# Patient Record
Sex: MALE | Race: WHITE | NOT HISPANIC OR LATINO | Employment: OTHER | URBAN - METROPOLITAN AREA
[De-identification: names, ages, dates, MRNs, and addresses within clinical notes are randomized per-mention and may not be internally consistent; named-entity substitution may affect disease eponyms.]

---

## 2023-09-28 PROBLEM — L21.9 SEBORRHEIC DERMATITIS, UNSPECIFIED: Status: ACTIVE | Noted: 2023-04-11

## 2023-09-28 PROBLEM — M23.91 INTERNAL DERANGEMENT OF KNEE JOINT, RIGHT: Status: ACTIVE | Noted: 2023-09-28

## 2023-09-28 PROBLEM — C44.41 BASAL CELL CARCINOMA OF SKIN OF SCALP AND NECK: Status: ACTIVE | Noted: 2023-04-11

## 2023-09-28 PROBLEM — L30.4 ERYTHEMA INTERTRIGO: Status: ACTIVE | Noted: 2023-04-11

## 2023-09-28 PROBLEM — Z85.46 HISTORY OF PROSTATE CANCER: Status: ACTIVE | Noted: 2023-09-28

## 2023-09-28 PROBLEM — D49.9 NEOPLASM OF UNSPECIFIED BEHAVIOR OF UNSPECIFIED SITE: Status: ACTIVE | Noted: 2023-04-11

## 2023-09-28 PROBLEM — C44.519 BASAL CELL CARCINOMA OF SKIN OF TRUNK, EXCEPT SCROTUM: Status: ACTIVE | Noted: 2023-04-11

## 2023-09-28 PROBLEM — D22.62 MELANOCYTIC NEVI OF LEFT UPPER LIMB, INCLUDING SHOULDER: Status: ACTIVE | Noted: 2023-04-11

## 2023-09-28 PROBLEM — M25.562 LEFT KNEE PAIN: Status: ACTIVE | Noted: 2023-09-28

## 2023-09-28 PROBLEM — L70.0 ACNE VULGARIS: Status: ACTIVE | Noted: 2023-04-11

## 2023-09-28 PROBLEM — M75.100 ROTATOR CUFF TEAR: Status: ACTIVE | Noted: 2023-09-28

## 2023-09-28 PROBLEM — D22.5 MELANOCYTIC NEVI OF TRUNK: Status: ACTIVE | Noted: 2023-04-11

## 2023-09-28 PROBLEM — B35.1 TINEA UNGUIUM: Status: ACTIVE | Noted: 2023-04-11

## 2023-09-28 PROBLEM — M25.561 RIGHT KNEE PAIN: Status: ACTIVE | Noted: 2023-09-28

## 2023-09-28 PROBLEM — L72.3 SEBACEOUS CYST: Status: ACTIVE | Noted: 2023-04-11

## 2023-09-28 PROBLEM — K43.9 EPIGASTRIC HERNIA: Status: ACTIVE | Noted: 2023-09-28

## 2023-09-28 PROBLEM — D22.70 MELANOCYTIC NEVI OF LOWER LIMB, INCLUDING HIP: Status: ACTIVE | Noted: 2023-04-11

## 2023-09-28 PROBLEM — L56.8 OTHER SPECIFIED ACUTE SKIN CHANGES DUE TO ULTRAVIOLET RADIATION: Status: ACTIVE | Noted: 2023-04-11

## 2023-09-28 PROBLEM — L82.1 OTHER SEBORRHEIC KERATOSIS: Status: ACTIVE | Noted: 2023-04-11

## 2023-09-28 PROBLEM — Z85.828 PERSONAL HISTORY OF OTHER MALIGNANT NEOPLASM OF SKIN: Status: ACTIVE | Noted: 2023-04-11

## 2023-09-28 PROBLEM — L90.5 SCAR CONDITION AND FIBROSIS OF SKIN: Status: ACTIVE | Noted: 2023-04-11

## 2023-09-28 PROBLEM — B35.3 TINEA PEDIS: Status: ACTIVE | Noted: 2023-04-11

## 2023-09-28 PROBLEM — J06.9 UPPER RESPIRATORY INFECTION: Status: ACTIVE | Noted: 2023-09-28

## 2023-09-28 PROBLEM — D36.7 BENIGN NEOPLASM OF TRUNK: Status: ACTIVE | Noted: 2023-04-11

## 2023-09-28 PROBLEM — M25.511 RIGHT SHOULDER PAIN: Status: ACTIVE | Noted: 2023-09-28

## 2023-09-28 PROBLEM — L73.8 OTHER SPECIFIED FOLLICULAR DISORDERS: Status: ACTIVE | Noted: 2023-04-11

## 2023-09-28 PROBLEM — C44.612 BASAL CELL CARCINOMA OF SKIN OF RIGHT UPPER LIMB, INCLUDING SHOULDER: Status: ACTIVE | Noted: 2023-04-11

## 2023-09-28 PROBLEM — L71.9 ROSACEA, UNSPECIFIED: Status: ACTIVE | Noted: 2023-04-11

## 2023-09-28 PROBLEM — E29.1 HYPOGONADISM MALE: Status: ACTIVE | Noted: 2023-09-28

## 2023-09-28 PROBLEM — J45.909 ASTHMA (HHS-HCC): Status: ACTIVE | Noted: 2023-09-28

## 2023-09-28 PROBLEM — L91.8 OTHER HYPERTROPHIC DISORDERS OF THE SKIN: Status: ACTIVE | Noted: 2023-04-11

## 2023-09-28 PROBLEM — R39.198 DIFFICULTY URINATING: Status: ACTIVE | Noted: 2023-09-28

## 2023-09-28 PROBLEM — L90.5 SCAR: Status: ACTIVE | Noted: 2023-04-11

## 2023-09-28 PROBLEM — L81.4 OTHER MELANIN HYPERPIGMENTATION: Status: ACTIVE | Noted: 2023-04-11

## 2023-09-28 PROBLEM — B35.4 TINEA CORPORIS: Status: ACTIVE | Noted: 2023-04-11

## 2023-09-28 PROBLEM — D48.5 NEOPLASM OF UNCERTAIN BEHAVIOR OF SKIN: Status: ACTIVE | Noted: 2023-04-11

## 2023-09-28 RX ORDER — KETOCONAZOLE 20 MG/G
1 CREAM TOPICAL
COMMUNITY
Start: 2019-06-27

## 2023-09-28 RX ORDER — VALACYCLOVIR HYDROCHLORIDE 1 G/1
1 TABLET, FILM COATED ORAL
COMMUNITY
Start: 2016-04-27 | End: 2023-10-26 | Stop reason: ALTCHOICE

## 2023-09-28 RX ORDER — OXYCODONE AND ACETAMINOPHEN 5; 325 MG/1; MG/1
1 TABLET ORAL EVERY 6 HOURS PRN
COMMUNITY
Start: 2022-08-19 | End: 2023-10-26 | Stop reason: ALTCHOICE

## 2023-09-28 RX ORDER — VALACYCLOVIR HYDROCHLORIDE 500 MG/1
500 TABLET, FILM COATED ORAL 3 TIMES DAILY
COMMUNITY
Start: 2021-04-22

## 2023-09-28 RX ORDER — BUDESONIDE AND FORMOTEROL FUMARATE DIHYDRATE 80; 4.5 UG/1; UG/1
AEROSOL RESPIRATORY (INHALATION)
COMMUNITY
Start: 2018-02-06 | End: 2024-01-05 | Stop reason: DRUGHIGH

## 2023-09-28 RX ORDER — ALBUTEROL SULFATE 90 UG/1
2 AEROSOL, METERED RESPIRATORY (INHALATION)
COMMUNITY
Start: 2014-05-21

## 2023-09-28 RX ORDER — TERBINAFINE HYDROCHLORIDE 250 MG/1
250 TABLET ORAL
COMMUNITY
Start: 2021-10-14

## 2023-09-28 RX ORDER — METRONIDAZOLE 7.5 MG/G
1 CREAM TOPICAL
COMMUNITY
Start: 2019-06-27

## 2023-10-26 ENCOUNTER — OFFICE VISIT (OUTPATIENT)
Dept: SURGERY | Facility: CLINIC | Age: 68
End: 2023-10-26
Payer: COMMERCIAL

## 2023-10-26 VITALS
BODY MASS INDEX: 33.29 KG/M2 | SYSTOLIC BLOOD PRESSURE: 147 MMHG | HEART RATE: 83 BPM | DIASTOLIC BLOOD PRESSURE: 80 MMHG | WEIGHT: 232 LBS

## 2023-10-26 DIAGNOSIS — K42.9 UMBILICAL HERNIA WITHOUT OBSTRUCTION AND WITHOUT GANGRENE: Primary | ICD-10-CM

## 2023-10-26 PROCEDURE — 1126F AMNT PAIN NOTED NONE PRSNT: CPT | Performed by: SURGERY

## 2023-10-26 PROCEDURE — 1159F MED LIST DOCD IN RCRD: CPT | Performed by: SURGERY

## 2023-10-26 PROCEDURE — 1160F RVW MEDS BY RX/DR IN RCRD: CPT | Performed by: SURGERY

## 2023-10-26 PROCEDURE — 99213 OFFICE O/P EST LOW 20 MIN: CPT | Performed by: SURGERY

## 2023-10-26 ASSESSMENT — PAIN SCALES - GENERAL: PAINLEVEL: 0-NO PAIN

## 2023-10-26 NOTE — PROGRESS NOTES
History Of Present Illness  Zak Brizuela is a 67 y.o. male presenting with an umbilical hernia.  1 year ago I did an epigastric hernia.  He notices slightly popping up below his umbilicus.  He does routinely get skin cancer checks.  He is a small area on his umbilical hernia that his dermatologist is following.  His health is otherwise been maintained.      Last Recorded Vitals  Blood pressure 147/80, pulse 83, weight 105 kg (232 lb).  Physical Examination  He has an incision vertically above his umbilicus.  At his umbilicus he does have hernia that is reducible.  On the top of that hernia he does have a small skin changes.    Relevant Results    Assessment/Plan umbilical hernia with a skin change on top.  We will plan on excising the skin changes for pathology along with repairing the hernia.  Since it is in the same area as the last one  will placed a small piece of mesh underneath to help decrease the risk for hernia recurrence.    Harris Gunderson MD FACS  Professor of Surgery  Tammy Goodwin Chair in Surgical [place]  [facility] [employer]  [address] [place] [address], [address]  Phone [phone]  email: [email or web address]

## 2023-11-09 RX ORDER — BUDESONIDE AND FORMOTEROL FUMARATE DIHYDRATE 160; 4.5 UG/1; UG/1
2 AEROSOL RESPIRATORY (INHALATION) 2 TIMES DAILY
COMMUNITY
Start: 2023-10-09

## 2023-11-15 ENCOUNTER — TELEPHONE (OUTPATIENT)
Dept: PRIMARY CARE | Facility: CLINIC | Age: 68
End: 2023-11-15
Payer: COMMERCIAL

## 2023-11-15 DIAGNOSIS — Z00.00 WELLNESS EXAMINATION: Primary | ICD-10-CM

## 2023-11-15 DIAGNOSIS — I35.1 MILD AORTIC REGURGITATION: ICD-10-CM

## 2023-11-15 DIAGNOSIS — I77.810 ASCENDING AORTA DILATATION (CMS-HCC): ICD-10-CM

## 2023-11-15 PROBLEM — Z86.16 HISTORY OF COVID-19: Status: ACTIVE | Noted: 2022-01-10

## 2023-11-15 PROBLEM — E78.5 DYSLIPIDEMIA: Status: ACTIVE | Noted: 2023-09-13

## 2023-11-15 PROBLEM — E34.9 TESTOSTERONE DEFICIENCY: Status: ACTIVE | Noted: 2018-02-06

## 2023-11-15 PROBLEM — I51.9 LEFT VENTRICULAR DIASTOLIC DYSFUNCTION: Status: ACTIVE | Noted: 2023-06-23

## 2023-11-15 PROBLEM — C44.92 SCCA (SQUAMOUS CELL CARCINOMA) OF SKIN: Status: ACTIVE | Noted: 2018-02-06

## 2023-11-15 PROBLEM — J45.30 MILD PERSISTENT ASTHMA WITHOUT COMPLICATION (HHS-HCC): Status: ACTIVE | Noted: 2017-02-01

## 2023-11-15 PROBLEM — R93.1 AGATSTON CORONARY ARTERY CALCIUM SCORE LESS THAN 100: Status: ACTIVE | Noted: 2022-08-23

## 2023-11-15 PROBLEM — G47.33 OSA (OBSTRUCTIVE SLEEP APNEA): Status: ACTIVE | Noted: 2023-08-15

## 2023-11-15 PROBLEM — C43.9 MELANOMA (MULTI): Status: ACTIVE | Noted: 2018-02-06

## 2023-11-15 PROBLEM — D12.6 TUBULAR ADENOMA OF COLON: Status: ACTIVE | Noted: 2021-05-25

## 2023-11-15 PROBLEM — C44.91 BCC (BASAL CELL CARCINOMA OF SKIN): Status: ACTIVE | Noted: 2018-02-06

## 2023-11-15 PROBLEM — K21.9 GASTROESOPHAGEAL REFLUX DISEASE: Status: ACTIVE | Noted: 2017-02-01

## 2023-11-16 PROBLEM — I77.810 ASCENDING AORTA DILATATION (CMS-HCC): Status: ACTIVE | Noted: 2023-11-16

## 2023-11-16 NOTE — TELEPHONE ENCOUNTER
Patient and I spoke.    He is looking to start on medical weight loss program and has been seen by endocrinology at [facility] (Dr. Eng).  Recommendation has been to try phentermine.  Previous echocardiogram notable for mild aortic regurgitation/aorta dilation.  Patient is not symptomatic.  Cardiology consult requested by endocrinology, the patient has not been successful in scheduling timely [facility] cardiology consult.  He would like to see  cardiologist.    Message will be sent to one of the  cardiologist regarding scheduling a consult.    Patient will also be scheduled for physical in Jan or Feb.  Orders to be placed for visit.    Hang Dugan MD

## 2023-11-29 ENCOUNTER — PATIENT MESSAGE (OUTPATIENT)
Dept: DERMATOLOGY | Facility: CLINIC | Age: 68
End: 2023-11-29
Payer: COMMERCIAL

## 2023-12-01 PROBLEM — I10 ESSENTIAL HYPERTENSION: Status: ACTIVE | Noted: 2023-11-30

## 2023-12-04 ENCOUNTER — ANESTHESIA EVENT (OUTPATIENT)
Dept: OPERATING ROOM | Facility: HOSPITAL | Age: 68
End: 2023-12-04
Payer: COMMERCIAL

## 2023-12-04 NOTE — ANESTHESIA PREPROCEDURE EVALUATION
Patient: Zak Brizuela    Procedure Information       Date/Time: 12/05/23 0830    Procedure: Umbilical Hernia Repair    Location: [facility] OR 09 / Virtual [facility] OR    Surgeons: Harris Gunderson MD            Relevant Problems   Anesthesia  Past Surgical History:  04/14/2014: CATARACT EXTRACTION      Comment:  Cataract Extraction  04/15/2014: HERNIA REPAIR      Comment:  Inguinal Hernia Repair  05/29/2016: KNEE ARTHROSCOPY W/ DEBRIDEMENT      Comment:  Arthroscopy Knee Right        Cardiovascular  TTE may 2023:    CONCLUSIONS:   - Exam indication: Shortness of Breath   - The left ventricle is normal in size. Left ventricular systolic function is   normal. EF = 56 ± 5% (2D biplane) Grade I left ventricular diastolic dysfunction.   - The right ventricle is normal in size. Right ventricular systolic function is   normal.   - The visualized aorta is dilated with a maximal dimension of 4.3 cm.   - There is mild (1+ - 2+) aortic valve regurgitation.   - The patient has not had a prior CC echocardiographic exam for comparison.      (+) Essential hypertension   (+) Mild aortic regurgitation      GI   (+) Gastroesophageal reflux disease      Pulmonary  PFT 8.8.23:    IMPRESSION:   Spirometry indicates moderate obstruction.   There is no significant bronchodilator response.       Home Sleep Apnea Study:    IMPRESSION/RECOMMENDATIONS:   1. This study confirms a diagnosis of at least mild obstructive sleep apnea.   2. The results of this study may represent an underestimation of the degree   of obstructive sleep apnea, especially hypopneas, because of the known   limitations of HSAT, such as inability to record arousals because EEG is not   recorded.   3. Treatment of mild sleep apnea can include treatment of allergies, oral   appliance therapy or ENT evaluation of any airway abnormalities. PAP therapy   may be considered in patients with documented symptoms of daytime sleepiness,   impaired cognition, mood disorder, insomnia, or  documented hypertension,   ischemic heart disease, or history of stroke.      (+) Asthma   (+) Mild persistent asthma without complication   (+) KEVIN (obstructive sleep apnea)      Infectious Disease   (+) Tinea corporis   (+) Tinea pedis   (+) Tinea unguium   (+) Upper respiratory infection      Other  Past Medical History:  01/06/2015: Malignant neoplasm of prostate (CMS/HCC)      Comment:  Prostate cancer  No date: Meralgia paresthetica, left lower limb      Comment:  Meralgia paresthetica of left side  No date: Personal history of (healed) traumatic fracture      Comment:  History of fracture of ankle  No date: Personal history of malignant melanoma of skin      Comment:  History of malignant melanoma  04/14/2014: Personal history of other diseases of the respiratory   system      Comment:  History of acute bronchitis  No date: Personal history of other diseases of urinary system      Comment:  History of urethral stricture  No date: Personal history of other malignant neoplasm of skin      Comment:  History of basal cell carcinoma         Clinical information reviewed:                   NPO Detail:  No data recorded     Physical Exam    Airway  Mallampati: II  TM distance: >3 FB  Neck ROM: full     Cardiovascular   Rhythm: regular     Dental    Pulmonary   Breath sounds clear to auscultation     Abdominal            Anesthesia Plan    ASA 3     MAC     The patient is not a current smoker.  Patient did not smoke on day of procedure.    intravenous induction   Anesthetic plan and risks discussed with patient.    Plan discussed with CRNA.

## 2023-12-05 ENCOUNTER — ANESTHESIA (OUTPATIENT)
Dept: OPERATING ROOM | Facility: HOSPITAL | Age: 68
End: 2023-12-05
Payer: COMMERCIAL

## 2023-12-05 ENCOUNTER — HOSPITAL ENCOUNTER (OUTPATIENT)
Facility: HOSPITAL | Age: 68
Setting detail: OUTPATIENT SURGERY
Discharge: HOME | End: 2023-12-05
Attending: SURGERY | Admitting: SURGERY
Payer: COMMERCIAL

## 2023-12-05 VITALS
DIASTOLIC BLOOD PRESSURE: 55 MMHG | OXYGEN SATURATION: 95 % | RESPIRATION RATE: 16 BRPM | TEMPERATURE: 97.5 F | HEART RATE: 88 BPM | SYSTOLIC BLOOD PRESSURE: 135 MMHG

## 2023-12-05 DIAGNOSIS — K42.9 UMBILICAL HERNIA WITHOUT OBSTRUCTION AND WITHOUT GANGRENE: Primary | ICD-10-CM

## 2023-12-05 PROCEDURE — 2500000002 HC RX 250 W HCPCS SELF ADMINISTERED DRUGS (ALT 637 FOR MEDICARE OP, ALT 636 FOR OP/ED): Performed by: ANESTHESIOLOGY

## 2023-12-05 PROCEDURE — 7100000001 HC RECOVERY ROOM TIME - INITIAL BASE CHARGE: Performed by: SURGERY

## 2023-12-05 PROCEDURE — 3600000003 HC OR TIME - INITIAL BASE CHARGE - PROCEDURE LEVEL THREE: Performed by: SURGERY

## 2023-12-05 PROCEDURE — 3700000002 HC GENERAL ANESTHESIA TIME - EACH INCREMENTAL 1 MINUTE: Performed by: SURGERY

## 2023-12-05 PROCEDURE — 3600000008 HC OR TIME - EACH INCREMENTAL 1 MINUTE - PROCEDURE LEVEL THREE: Performed by: SURGERY

## 2023-12-05 PROCEDURE — 2720000007 HC OR 272 NO HCPCS: Performed by: SURGERY

## 2023-12-05 PROCEDURE — 94640 AIRWAY INHALATION TREATMENT: CPT | Mod: 59 | Performed by: ANESTHESIOLOGY

## 2023-12-05 PROCEDURE — 0753T DGTZ GLS MCRSCP SLD LEVEL IV: CPT | Mod: TC,SUR,AHULAB | Performed by: SURGERY

## 2023-12-05 PROCEDURE — 2500000004 HC RX 250 GENERAL PHARMACY W/ HCPCS (ALT 636 FOR OP/ED): Performed by: SURGERY

## 2023-12-05 PROCEDURE — A49591 PR RPR AA HERNIA 1ST < 3 CM REDUCIBLE: Performed by: NURSE ANESTHETIST, CERTIFIED REGISTERED

## 2023-12-05 PROCEDURE — 88305 TISSUE EXAM BY PATHOLOGIST: CPT | Performed by: PATHOLOGY

## 2023-12-05 PROCEDURE — 7100000002 HC RECOVERY ROOM TIME - EACH INCREMENTAL 1 MINUTE: Performed by: SURGERY

## 2023-12-05 PROCEDURE — A49591 PR RPR AA HERNIA 1ST < 3 CM REDUCIBLE: Performed by: ANESTHESIOLOGY

## 2023-12-05 PROCEDURE — 2500000005 HC RX 250 GENERAL PHARMACY W/O HCPCS: Performed by: NURSE ANESTHETIST, CERTIFIED REGISTERED

## 2023-12-05 PROCEDURE — 7100000009 HC PHASE TWO TIME - INITIAL BASE CHARGE: Performed by: SURGERY

## 2023-12-05 PROCEDURE — 2500000004 HC RX 250 GENERAL PHARMACY W/ HCPCS (ALT 636 FOR OP/ED): Performed by: NURSE ANESTHETIST, CERTIFIED REGISTERED

## 2023-12-05 PROCEDURE — 3700000001 HC GENERAL ANESTHESIA TIME - INITIAL BASE CHARGE: Performed by: SURGERY

## 2023-12-05 PROCEDURE — 2500000004 HC RX 250 GENERAL PHARMACY W/ HCPCS (ALT 636 FOR OP/ED): Performed by: ANESTHESIOLOGY

## 2023-12-05 PROCEDURE — 94762 N-INVAS EAR/PLS OXIMTRY CONT: CPT | Mod: 59 | Performed by: NURSE ANESTHETIST, CERTIFIED REGISTERED

## 2023-12-05 PROCEDURE — A4217 STERILE WATER/SALINE, 500 ML: HCPCS | Performed by: SURGERY

## 2023-12-05 PROCEDURE — 49591 RPR AA HRN 1ST < 3 CM RDC: CPT | Performed by: SURGERY

## 2023-12-05 PROCEDURE — 2500000005 HC RX 250 GENERAL PHARMACY W/O HCPCS: Performed by: SURGERY

## 2023-12-05 PROCEDURE — 7100000010 HC PHASE TWO TIME - EACH INCREMENTAL 1 MINUTE: Performed by: SURGERY

## 2023-12-05 RX ORDER — LIDOCAINE HCL/PF 100 MG/5ML
SYRINGE (ML) INTRAVENOUS AS NEEDED
Status: DISCONTINUED | OUTPATIENT
Start: 2023-12-05 | End: 2023-12-05

## 2023-12-05 RX ORDER — ONDANSETRON HYDROCHLORIDE 2 MG/ML
4 INJECTION, SOLUTION INTRAVENOUS ONCE AS NEEDED
Status: DISCONTINUED | OUTPATIENT
Start: 2023-12-05 | End: 2023-12-05 | Stop reason: HOSPADM

## 2023-12-05 RX ORDER — CEFAZOLIN 1 G/1
INJECTION, POWDER, FOR SOLUTION INTRAVENOUS AS NEEDED
Status: DISCONTINUED | OUTPATIENT
Start: 2023-12-05 | End: 2023-12-05

## 2023-12-05 RX ORDER — SODIUM CHLORIDE 0.9 G/100ML
IRRIGANT IRRIGATION AS NEEDED
Status: DISCONTINUED | OUTPATIENT
Start: 2023-12-05 | End: 2023-12-05 | Stop reason: HOSPADM

## 2023-12-05 RX ORDER — ALBUTEROL SULFATE 0.83 MG/ML
2.5 SOLUTION RESPIRATORY (INHALATION) EVERY 6 HOURS PRN
Status: DISCONTINUED | OUTPATIENT
Start: 2023-12-05 | End: 2023-12-05 | Stop reason: HOSPADM

## 2023-12-05 RX ORDER — LIDOCAINE HYDROCHLORIDE 10 MG/ML
0.1 INJECTION, SOLUTION EPIDURAL; INFILTRATION; INTRACAUDAL; PERINEURAL ONCE
Status: DISCONTINUED | OUTPATIENT
Start: 2023-12-05 | End: 2023-12-05 | Stop reason: HOSPADM

## 2023-12-05 RX ORDER — SODIUM CHLORIDE, SODIUM LACTATE, POTASSIUM CHLORIDE, CALCIUM CHLORIDE 600; 310; 30; 20 MG/100ML; MG/100ML; MG/100ML; MG/100ML
50 INJECTION, SOLUTION INTRAVENOUS CONTINUOUS
Status: DISCONTINUED | OUTPATIENT
Start: 2023-12-05 | End: 2023-12-05 | Stop reason: HOSPADM

## 2023-12-05 RX ORDER — ACETAMINOPHEN 325 MG/1
650 TABLET ORAL EVERY 4 HOURS PRN
Status: DISCONTINUED | OUTPATIENT
Start: 2023-12-05 | End: 2023-12-05 | Stop reason: HOSPADM

## 2023-12-05 RX ORDER — PROPOFOL 10 MG/ML
INJECTION, EMULSION INTRAVENOUS CONTINUOUS PRN
Status: DISCONTINUED | OUTPATIENT
Start: 2023-12-05 | End: 2023-12-05

## 2023-12-05 RX ORDER — MIDAZOLAM HYDROCHLORIDE 1 MG/ML
INJECTION INTRAMUSCULAR; INTRAVENOUS AS NEEDED
Status: DISCONTINUED | OUTPATIENT
Start: 2023-12-05 | End: 2023-12-05

## 2023-12-05 RX ORDER — KETOROLAC TROMETHAMINE 30 MG/ML
INJECTION, SOLUTION INTRAMUSCULAR; INTRAVENOUS AS NEEDED
Status: DISCONTINUED | OUTPATIENT
Start: 2023-12-05 | End: 2023-12-05

## 2023-12-05 RX ORDER — OXYCODONE AND ACETAMINOPHEN 5; 325 MG/1; MG/1
1 TABLET ORAL EVERY 6 HOURS PRN
Qty: 5 TABLET | Refills: 0 | Status: SHIPPED | OUTPATIENT
Start: 2023-12-05 | End: 2023-12-14 | Stop reason: ALTCHOICE

## 2023-12-05 RX ORDER — ONDANSETRON HYDROCHLORIDE 2 MG/ML
INJECTION, SOLUTION INTRAVENOUS AS NEEDED
Status: DISCONTINUED | OUTPATIENT
Start: 2023-12-05 | End: 2023-12-05

## 2023-12-05 RX ORDER — FENTANYL CITRATE 50 UG/ML
INJECTION, SOLUTION INTRAMUSCULAR; INTRAVENOUS AS NEEDED
Status: DISCONTINUED | OUTPATIENT
Start: 2023-12-05 | End: 2023-12-05

## 2023-12-05 RX ORDER — SODIUM CHLORIDE, SODIUM LACTATE, POTASSIUM CHLORIDE, CALCIUM CHLORIDE 600; 310; 30; 20 MG/100ML; MG/100ML; MG/100ML; MG/100ML
100 INJECTION, SOLUTION INTRAVENOUS CONTINUOUS
Status: DISCONTINUED | OUTPATIENT
Start: 2023-12-05 | End: 2023-12-05 | Stop reason: HOSPADM

## 2023-12-05 RX ADMIN — SODIUM CHLORIDE, POTASSIUM CHLORIDE, SODIUM LACTATE AND CALCIUM CHLORIDE 100 ML/HR: 600; 310; 30; 20 INJECTION, SOLUTION INTRAVENOUS at 09:25

## 2023-12-05 RX ADMIN — CEFAZOLIN 2 G: 1 INJECTION, POWDER, FOR SOLUTION INTRAMUSCULAR; INTRAVENOUS at 08:33

## 2023-12-05 RX ADMIN — MIDAZOLAM HYDROCHLORIDE 2 MG: 1 INJECTION, SOLUTION INTRAMUSCULAR; INTRAVENOUS at 08:27

## 2023-12-05 RX ADMIN — ONDANSETRON 4 MG: 2 INJECTION INTRAMUSCULAR; INTRAVENOUS at 08:35

## 2023-12-05 RX ADMIN — SODIUM CHLORIDE, SODIUM LACTATE, POTASSIUM CHLORIDE, AND CALCIUM CHLORIDE: 600; 310; 30; 20 INJECTION, SOLUTION INTRAVENOUS at 08:19

## 2023-12-05 RX ADMIN — ALBUTEROL SULFATE 2.5 MG: 2.5 SOLUTION RESPIRATORY (INHALATION) at 08:07

## 2023-12-05 RX ADMIN — LIDOCAINE HYDROCHLORIDE 70 MG: 20 INJECTION INTRAVENOUS at 08:31

## 2023-12-05 RX ADMIN — KETOROLAC TROMETHAMINE 30 MG: 30 INJECTION, SOLUTION INTRAMUSCULAR at 08:54

## 2023-12-05 RX ADMIN — PROPOFOL 200 MCG/KG/MIN: 10 INJECTION, EMULSION INTRAVENOUS at 08:31

## 2023-12-05 RX ADMIN — SODIUM CHLORIDE, POTASSIUM CHLORIDE, SODIUM LACTATE AND CALCIUM CHLORIDE 50 ML/HR: 600; 310; 30; 20 INJECTION, SOLUTION INTRAVENOUS at 08:00

## 2023-12-05 RX ADMIN — FENTANYL CITRATE 100 MCG: 50 INJECTION, SOLUTION INTRAMUSCULAR; INTRAVENOUS at 08:30

## 2023-12-05 SDOH — HEALTH STABILITY: MENTAL HEALTH: CURRENT SMOKER: 0

## 2023-12-05 ASSESSMENT — PAIN - FUNCTIONAL ASSESSMENT
PAIN_FUNCTIONAL_ASSESSMENT: 0-10

## 2023-12-05 ASSESSMENT — PAIN SCALES - GENERAL
PAINLEVEL_OUTOF10: 0 - NO PAIN
PAIN_LEVEL: 2
PAINLEVEL_OUTOF10: 0 - NO PAIN
PAINLEVEL_OUTOF10: 0 - NO PAIN

## 2023-12-05 NOTE — PERIOPERATIVE NURSING NOTE
0945- PHASE II - Report from PACU RN    0955- Home going instructions and Rx went over with patient and patient's family member. Educated on when to follow up with provider. All questions answered and patient and patient's family member verified understanding verbally.    0958- Patient helped to get dressed at this time    1005- IV removed at this time with IV catheter tip intact upon removal    1011- Patient transported to [facility] at this time in stable condition and with all belongings via wheelchair.

## 2023-12-05 NOTE — BRIEF OP NOTE
Date: 2023  OR Location: [facility] OR    Name: Zak Brizuela, : 1955, Age: 68 y.o., MRN: [id number], Sex: male    Diagnosis  Pre-op Diagnosis     * Umbilical hernia without obstruction and without gangrene [K42.9] Post-op Diagnosis     * Umbilical hernia without obstruction and without gangrene [K42.9]     Procedures  Umbilical Hernia Repair  69510 - [place] RPR AA HERNIA 1ST < 3 CM REDUCIBLE      Surgeons      * Harris Gunderson - Primary    Resident/Fellow/Other Assistant:  Surgeon(s) and Role: Lilian Morales MD    Procedure Summary  Anesthesia: Monitor Anesthesia Care  ASA: III  Anesthesia Staff: Anesthesiologist: Chadd Fajardo MD  CRNA: ANGELINA Brandt-CRNA  Estimated Blood Loss: 1mL  Intra-op Medications:   Medication Name Total Dose   sodium chloride 0.9 % irrigation solution 1,000 mL   BUPivacaine-EPINEPHrine (Marcaine w/EPI) 20 mL, lidocaine PF (Xylocaine) 20 mL syringe 19 mL              Anesthesia Record               Intraprocedure I/O Totals          Intake    .00 mL    Propofol Drip 0.00 mL    The total shown is the total volume documented since Anesthesia Start was filed.    Autologous Blood 0 mL    Cell Saver 0 mL    Total Intake 600 mL       Output    Urine 0 mL    Est. Blood Loss 2 mL    NG/OG Tube Output 0 mL    Other 0 mL    Total Output 2 mL       Net    Net Volume 598 mL          Specimen:   ID Type Source Tests Collected by Time   1 : Umbilical skin lesion Tissue SKIN EXCISION SURGICAL PATHOLOGY EXAM Harris Gunderson MD 2023 0844        Staff:   Circulator: Ada Alvarez RN; Mihaela You RN  Scrub Person: Peace Brenner          Findings: Small umbilical hernia. Mole at umbilicus excised.    Complications:  None; patient tolerated the procedure well.     Disposition: PACU - hemodynamically stable.  Condition: stable  Specimens Collected:   ID Type Source Tests Collected by Time   1 : Umbilical skin lesion Tissue SKIN EXCISION SURGICAL PATHOLOGY EXAM Harris MALLOY  MD Jalyn 12/5/2023 0844     Attending Attestation:     Lilian Morales MD  PGY-3 General Surgery

## 2023-12-05 NOTE — H&P
History Of Present Illness  Zak Brizuela is a 68 y.o. male presenting with umbilical hernia.  Also skin changes     Past Medical History  He has a past medical history of Malignant neoplasm of prostate (CMS/HCC) (01/06/2015), Meralgia paresthetica, left lower limb, Personal history of (healed) traumatic fracture, Personal history of malignant melanoma of skin, Personal history of other diseases of the respiratory system (04/14/2014), Personal history of other diseases of urinary system, and Personal history of other malignant neoplasm of skin.    Surgical History  He has a past surgical history that includes Cataract extraction (04/14/2014); Knee arthroscopy w/ debridement (05/29/2016); and Hernia repair (04/15/2014).     Social History  He reports that he has never smoked. He has never been exposed to tobacco smoke. He has never used smokeless tobacco. He reports that he does not currently use alcohol. No history on file for drug use.    Family History  Family History   Problem Relation Name Age of Onset    Arthritis Mother      Pancreatic cancer Father          carcinoma of the pancreas    Arthritis Father      Colon cancer Father      Melanoma Father      Pancreatic cancer Paternal Grandfather          Allergies  Patient has no known allergies.    Review of Systems     Physical ExamNormal respiration. Hernia and skin change marked     L   Assessment/Plan   Principal Problem:    Umbilical hernia without obstruction and without gangrene      Plan repair       I     Harris Gunderson MD

## 2023-12-05 NOTE — ANESTHESIA POSTPROCEDURE EVALUATION
Patient: Zak Brizuela    Procedure Summary       Date: 12/05/23 Room / Location: [facility] A OR 09 / Virtual [facility] OR    Anesthesia Start: 0719 Anesthesia Stop: 0907    Procedure: Umbilical Hernia Repair Diagnosis:       Umbilical hernia without obstruction and without gangrene      (Umbilical hernia without obstruction and without gangrene [K42.9])    Surgeons: Harris Gunderson MD Responsible Provider: Chadd Fajardo MD    Anesthesia Type: MAC ASA Status: 3            Anesthesia Type: MAC    Vitals Value Taken Time   /55 12/05/23 0944   Temp 36.2 °C (97.2 °F) 12/05/23 0905   Pulse 80 12/05/23 0943   Resp 16 12/05/23 0944   SpO2 96 % 12/05/23 0943   Vitals shown include unvalidated device data.    Anesthesia Post Evaluation    Patient participation: complete - patient participated  Level of consciousness: awake  Pain score: 2  Pain management: adequate  Airway patency: patent  Cardiovascular status: acceptable  Respiratory status: acceptable  Hydration status: acceptable  Postoperative Nausea and Vomiting: none        There were no known notable events for this encounter.

## 2023-12-05 NOTE — OP NOTE
Umbilical Hernia Repair Operative Note     Date: 2023  OR Location: [facility] OR    Name: Zak Brizuela, : 1955, Age: 68 y.o., MRN: [id number], Sex: male    Diagnosis  Pre-op Diagnosis     * Umbilical hernia without obstruction and without gangrene [K42.9] Post-op Diagnosis     * Umbilical hernia without obstruction and without gangrene [K42.9]     Procedures  Umbilical Hernia Repair  79221 - [place] RPR AA HERNIA 1ST < 3 CM REDUCIBLE      Surgeons      * Harris Gunderson - Primary    Resident/Fellow/Other Assistant:  Surgeon(s) and Role:    Procedure Summary  Anesthesia: Monitor Anesthesia Care  ASA: III  Anesthesia Staff: Anesthesiologist: Chadd Fajardo MD  CRNA: ANGELINA Brandt-CRNA  Estimated Blood Loss: 0mL  Intra-op Medications:   Medication Name Total Dose   sodium chloride 0.9 % irrigation solution 1,000 mL   BUPivacaine-EPINEPHrine (Marcaine w/EPI) 20 mL, lidocaine PF (Xylocaine) 20 mL syringe 19 mL              Anesthesia Record               Intraprocedure I/O Totals          Intake    .00 mL    Propofol Drip 0.00 mL    The total shown is the total volume documented since Anesthesia Start was filed.    Autologous Blood 0 mL    Cell Saver 0 mL    Total Intake 600 mL       Output    Urine 0 mL    Est. Blood Loss 2 mL    NG/OG Tube Output 0 mL    Other 0 mL    Total Output 2 mL       Net    Net Volume 598 mL          Specimen:   ID Type Source Tests Collected by Time   1 : Umbilical skin lesion Tissue SKIN EXCISION SURGICAL PATHOLOGY EXAM Harris Gunderson MD 2023 0844        Staff:   Circulator: Ada Alvarez RN; Mihaela You RN  Scrub Person: Peace Brenner         Drains and/or Catheters: * None in log *    Tourniquet Times:         Implants:     Findings: 1 cm facial defect    Indications: Zak Brizuela is an 68 y.o. male who is having surgery for Umbilical hernia without obstruction and without gangrene [K42.9]. He also a 5 mm mole in his umbilicus that has been followed  by his dermatologist.  They recommended that if he had surgery to have a biopsy also.  I discussed with him just excising the whole mole.  He agrees with this plan.    The patient was seen in the preoperative area. The risks, benefits, complications, treatment options, non-operative alternatives, expected recovery and outcomes were discussed with the patient. The possibilities of reaction to medication, pulmonary aspiration, injury to surrounding structures, bleeding, recurrent infection, the need for additional procedures, failure to diagnose a condition, and creating a complication requiring transfusion or operation were discussed with the patient. The patient concurred with the proposed plan, giving informed consent.  The site of surgery was properly noted/marked if necessary per policy. The patient has been actively warmed in preoperative area. Preoperative antibiotics have been ordered and given within 1 hours of incision. Venous thrombosis prophylaxis have been ordered including bilateral sequential compression devices    Procedure Details: Brought to the operating room.  IV sedation was given.  Areas prepped and draped.  Local anesthetic was planned.  Planned an elliptical incision around the mole and a transverse area at the superior aspect of his umbilicus.  This was done the mole was removed with elliptical skin.  We then identified the preperitoneal fat herniating through.  This was resected.  This defect was less than 1 cm in size.  I cleaned off the fascial edges.  I closed the fascial edges with 0 Prolene in a transverse fashion.  Closed skin incision in 2 layers with 4-0 Vicryl.  Dressing was applied.  Complications:  None; patient tolerated the procedure well.    Disposition: PACU - hemodynamically stable.  Condition: stable         Additional Details:     Attending Attestation: I was present and scrubbed for the entire procedure.    Harris Gunderson  Phone Number: [phone]

## 2023-12-06 ASSESSMENT — PAIN SCALES - GENERAL: PAINLEVEL_OUTOF10: 3

## 2023-12-11 LAB
LABORATORY COMMENT REPORT: NORMAL
PATH REPORT.FINAL DX SPEC: NORMAL
PATH REPORT.GROSS SPEC: NORMAL
PATH REPORT.RELEVANT HX SPEC: NORMAL
PATH REPORT.TOTAL CANCER: NORMAL

## 2023-12-13 ENCOUNTER — TELEPHONE (OUTPATIENT)
Dept: DERMATOLOGY | Facility: CLINIC | Age: 68
End: 2023-12-13
Payer: COMMERCIAL

## 2023-12-14 ENCOUNTER — OFFICE VISIT (OUTPATIENT)
Dept: SURGERY | Facility: CLINIC | Age: 68
End: 2023-12-14
Payer: COMMERCIAL

## 2023-12-14 VITALS
DIASTOLIC BLOOD PRESSURE: 77 MMHG | HEART RATE: 87 BPM | SYSTOLIC BLOOD PRESSURE: 132 MMHG | BODY MASS INDEX: 33.86 KG/M2 | WEIGHT: 236 LBS | TEMPERATURE: 97.9 F

## 2023-12-14 DIAGNOSIS — K42.9 UMBILICAL HERNIA WITHOUT OBSTRUCTION AND WITHOUT GANGRENE: Primary | ICD-10-CM

## 2023-12-14 PROCEDURE — 1160F RVW MEDS BY RX/DR IN RCRD: CPT | Performed by: SURGERY

## 2023-12-14 PROCEDURE — 3078F DIAST BP <80 MM HG: CPT | Performed by: SURGERY

## 2023-12-14 PROCEDURE — 99212 OFFICE O/P EST SF 10 MIN: CPT | Performed by: SURGERY

## 2023-12-14 PROCEDURE — 1126F AMNT PAIN NOTED NONE PRSNT: CPT | Performed by: SURGERY

## 2023-12-14 PROCEDURE — 1036F TOBACCO NON-USER: CPT | Performed by: SURGERY

## 2023-12-14 PROCEDURE — 3075F SYST BP GE 130 - 139MM HG: CPT | Performed by: SURGERY

## 2023-12-14 PROCEDURE — 1159F MED LIST DOCD IN RCRD: CPT | Performed by: SURGERY

## 2023-12-14 ASSESSMENT — PAIN SCALES - GENERAL: PAINLEVEL: 0-NO PAIN

## 2023-12-14 NOTE — PROGRESS NOTES
History Of Present Illness  Zak Brizuela is a 68 y.o. male presenting status post umbilical hernia repair along with a nevus excision.  I reviewed the pathology with him.  He is also talk to his dermatologist at the [place] [facility] will be following up with him in several months also.      Last Recorded Vitals  Blood pressure 132/77, pulse 87, temperature 36.6 °C (97.9 °F), weight 107 kg (236 lb).  Physical Exam wound is healing well.      Assessment/Plan   He has no limitations to activities.  He will follow-up me for the hernia as needed.  He will follow-up with his dermatologist for the pathology of his excision.  At this point in time just watching be appropriate.  I did send the pathology to one of our melanoma experts at  and I will call him when I get follow-up from that person.    Harris Gunderson MD FACS  Professor of Surgery  Tammy Goodwin Chair in Surgical [place]  [facility] [employer]  [address] [place] [address], [address]  Phone [phone]  email: [email or web address]

## 2023-12-18 NOTE — PATIENT COMMUNICATION
"[employer] message was forwarded to me by patient's PCP, I responded in that chain:    The mole had some atypical features. Sometimes this is normal for moles on \"special sites\" like the umbilicus (ie belly button). The recommendation is for me to look and see how the scar healed. If the mole seems to be regrowing, then we need to do a re-excision.      Let's move up your next appointment to 2-3 months from now for me to see. I can send a message to my scheduling team.   "

## 2023-12-20 ENCOUNTER — TELEPHONE (OUTPATIENT)
Dept: DERMATOLOGY | Facility: CLINIC | Age: 68
End: 2023-12-20
Payer: COMMERCIAL

## 2024-01-05 ENCOUNTER — PATIENT MESSAGE (OUTPATIENT)
Dept: PRIMARY CARE | Facility: CLINIC | Age: 69
End: 2024-01-05

## 2024-01-05 ENCOUNTER — OFFICE VISIT (OUTPATIENT)
Dept: PRIMARY CARE | Facility: CLINIC | Age: 69
End: 2024-01-05
Payer: COMMERCIAL

## 2024-01-05 VITALS
OXYGEN SATURATION: 96 % | HEART RATE: 88 BPM | TEMPERATURE: 98.1 F | DIASTOLIC BLOOD PRESSURE: 70 MMHG | HEIGHT: 70 IN | WEIGHT: 231 LBS | BODY MASS INDEX: 33.07 KG/M2 | SYSTOLIC BLOOD PRESSURE: 138 MMHG

## 2024-01-05 DIAGNOSIS — J06.9 UPPER RESPIRATORY TRACT INFECTION, UNSPECIFIED TYPE: Primary | ICD-10-CM

## 2024-01-05 DIAGNOSIS — J45.31 MILD PERSISTENT ASTHMA WITH ACUTE EXACERBATION (HHS-HCC): ICD-10-CM

## 2024-01-05 DIAGNOSIS — J20.8 ACUTE BRONCHITIS DUE TO OTHER SPECIFIED ORGANISMS: ICD-10-CM

## 2024-01-05 PROBLEM — E34.9 TESTOSTERONE DEFICIENCY: Status: RESOLVED | Noted: 2018-02-06 | Resolved: 2024-01-05

## 2024-01-05 PROBLEM — K21.9 GASTROESOPHAGEAL REFLUX DISEASE: Status: RESOLVED | Noted: 2017-02-01 | Resolved: 2024-01-05

## 2024-01-05 PROCEDURE — 3078F DIAST BP <80 MM HG: CPT | Performed by: INTERNAL MEDICINE

## 2024-01-05 PROCEDURE — 87637 SARSCOV2&INF A&B&RSV AMP PRB: CPT

## 2024-01-05 PROCEDURE — 3075F SYST BP GE 130 - 139MM HG: CPT | Performed by: INTERNAL MEDICINE

## 2024-01-05 PROCEDURE — 1159F MED LIST DOCD IN RCRD: CPT | Performed by: INTERNAL MEDICINE

## 2024-01-05 PROCEDURE — 1036F TOBACCO NON-USER: CPT | Performed by: INTERNAL MEDICINE

## 2024-01-05 PROCEDURE — 99214 OFFICE O/P EST MOD 30 MIN: CPT | Performed by: INTERNAL MEDICINE

## 2024-01-05 PROCEDURE — 1126F AMNT PAIN NOTED NONE PRSNT: CPT | Performed by: INTERNAL MEDICINE

## 2024-01-05 RX ORDER — LOSARTAN POTASSIUM 50 MG/1
50 TABLET ORAL DAILY
COMMUNITY
Start: 2023-12-18

## 2024-01-05 RX ORDER — DOXYCYCLINE 100 MG/1
100 CAPSULE ORAL 2 TIMES DAILY
Qty: 20 CAPSULE | Refills: 0 | Status: SHIPPED | OUTPATIENT
Start: 2024-01-05 | End: 2024-01-15

## 2024-01-05 RX ORDER — PREDNISONE 50 MG/1
50 TABLET ORAL DAILY
Qty: 3 TABLET | Refills: 0 | Status: SHIPPED | OUTPATIENT
Start: 2024-01-05 | End: 2024-01-08

## 2024-01-05 RX ORDER — BENZONATATE 100 MG/1
100 CAPSULE ORAL 3 TIMES DAILY PRN
Qty: 42 CAPSULE | Refills: 1 | Status: SHIPPED | OUTPATIENT
Start: 2024-01-05 | End: 2024-02-04

## 2024-01-05 ASSESSMENT — ENCOUNTER SYMPTOMS
CONSTIPATION: 0
RHINORRHEA: 0
CHILLS: 0
SORE THROAT: 0
COUGH: 1
HEADACHES: 0
PALPITATIONS: 0
FEVER: 0
FATIGUE: 0
DIZZINESS: 0
WHEEZING: 1
SHORTNESS OF BREATH: 1

## 2024-01-05 NOTE — PROGRESS NOTES
"Subjective   Patient ID: Zak Brizuela is a 68 y.o. male who presents for URI.    Patient presents for same-day/sick visit    1 week history of current symptoms starting approximately 12/29.  Symptoms have included chest congestion with cough initially productive of clear sputum, now green-brown in color.  Vaccinations are up-to-date  No COVID-19 staying at home.  No known sick contacts.  Patient has mild persistent asthma on maintenance inhaler and albuterol as needed.  He is on day 4 of azithromycin (Z-Rohan) and prednisone 50 mg daily (5-day course) without improvement in symptoms.  Mild dyspnea with stairs, though able to play paddle tennis earlier this week.  No fever, chills, myalgias, arthralgias, chest pain, palpitations.    Patient is compliant with his hypertension and hyperlipidemia medications.  Recent umbilical hernia surgery in December, healing well    Patient has upcoming wellness visit scheduled later this month.      URI   Associated symptoms include coughing and wheezing. Pertinent negatives include no chest pain, ear pain, headaches, rhinorrhea or sore throat.        Review of Systems   Constitutional:  Negative for chills, fatigue and fever.   HENT:  Negative for ear pain, postnasal drip, rhinorrhea and sore throat.    Respiratory:  Positive for cough, shortness of breath and wheezing.    Cardiovascular:  Negative for chest pain, palpitations and leg swelling.   Gastrointestinal:  Negative for constipation.   Neurological:  Negative for dizziness and headaches.       Objective   /70 (BP Location: Left arm, Patient Position: Sitting, BP Cuff Size: Adult)   Pulse 88   Temp 36.7 °C (98.1 °F)   Ht 1.778 m (5' 10\")   Wt 105 kg (231 lb)   SpO2 96%   BMI 33.15 kg/m²     Physical Exam  Vitals reviewed.   HENT:      Head: Normocephalic.      Right Ear: Tympanic membrane normal.      Left Ear: Tympanic membrane normal.      Mouth/Throat:      Mouth: Mucous membranes are moist.   Eyes:      " Conjunctiva/sclera: Conjunctivae normal.      Pupils: Pupils are equal, round, and reactive to light.   Cardiovascular:      Rate and Rhythm: Normal rate and regular rhythm.   Pulmonary:      Effort: Pulmonary effort is normal. No respiratory distress.      Breath sounds: Wheezing present.   Musculoskeletal:      Right lower leg: No edema.      Left lower leg: No edema.   Neurological:      Mental Status: He is alert.         Assessment/Plan     Upper respiratory infection/acute bronchitis  Persistent asthma with mild exacerbation  Nasal swab for RSV/influenza/COVID-19 sent  Stop azithromycin  Start doxycycline 100 mg twice a day for 10 days  Continue prednisone 50 mg daily for 3 additional days (to complete 8-day prednisone course)  Benzonatate 100 mg, 1 capsule 3 times a day as needed for cough  Maintain adequate hydration  Continue Symbicort 160/4.5, 2 puffs twice a day and albuterol inhaler, 2 puffs every 4-6 hours as needed for wheezing    Essential hypertension  Continue current medication regimen    Follow-up  Wellness exam/physical later this month as scheduled.  (Fasting lab work is ordered to complete 3 to 5 days prior)    Hang Dugan MD

## 2024-01-05 NOTE — PATIENT INSTRUCTIONS
Upper respiratory infection/acute bronchitis  Persistent asthma with mild exacerbation  Nasal swab for RSV/influenza/COVID-19 sent  Stop azithromycin  Start doxycycline 100 mg twice a day for 10 days  Continue prednisone 50 mg daily for 3 additional days (to complete 8-day prednisone course)  Benzonatate 100 mg, 1 capsule 3 times a day as needed for cough  Maintain adequate hydration  Continue Symbicort 160/4.5, 2 puffs twice a day and albuterol inhaler, 2 puffs every 4-6 hours as needed for wheezing    Essential hypertension  Continue current medication regimen    Follow-up  Wellness exam/physical later this month as scheduled.  (Fasting lab work is ordered to complete 3 to 5 days prior)

## 2024-01-05 NOTE — TELEPHONE ENCOUNTER
From: Zak Brizuela  To: Hang Dugan MD  Sent: 1/5/2024 10:57 AM EST  Subject: Regan Small. I am in the midst of a bronchial cold again. Taking the meds from Dr. Muniz over at clinic (arythromyicin and Prednisone) that she had provided last year. Day 4 and it is not getting better. They suggested a virtual visit with another provider over in pulminology at ...[facility] but she refused preferring a face to face next tuesday. I did schedule it but I am in for a crummy weekend if this does not improve for 4 days. Should I transfer now to a  pulminology team? What do you recommend? Phlem discolored and viscous, coughing and interferring with sleep (wife not happy either). Blood OX 95%, pretty run down and frustrated. Appreciate your follow up.

## 2024-01-06 LAB
FLUAV RNA RESP QL NAA+PROBE: NOT DETECTED
FLUBV RNA RESP QL NAA+PROBE: NOT DETECTED
RSV RNA RESP QL NAA+PROBE: NOT DETECTED
SARS-COV-2 ORF1AB RESP QL NAA+PROBE: NOT DETECTED

## 2024-01-15 ENCOUNTER — LAB (OUTPATIENT)
Dept: LAB | Facility: LAB | Age: 69
End: 2024-01-15
Payer: COMMERCIAL

## 2024-01-15 DIAGNOSIS — Z00.00 WELLNESS EXAMINATION: ICD-10-CM

## 2024-01-15 LAB
25(OH)D3 SERPL-MCNC: 16 NG/ML (ref 30–100)
ALBUMIN SERPL BCP-MCNC: 4.1 G/DL (ref 3.4–5)
ALP SERPL-CCNC: 55 U/L (ref 33–136)
ALT SERPL W P-5'-P-CCNC: 42 U/L (ref 10–52)
ANION GAP SERPL CALC-SCNC: 15 MMOL/L (ref 10–20)
APPEARANCE UR: CLEAR
AST SERPL W P-5'-P-CCNC: 25 U/L (ref 9–39)
BASOPHILS # BLD AUTO: 0.03 X10*3/UL (ref 0–0.1)
BASOPHILS NFR BLD AUTO: 0.5 %
BILIRUB SERPL-MCNC: 1.1 MG/DL (ref 0–1.2)
BILIRUB UR STRIP.AUTO-MCNC: NEGATIVE MG/DL
BUN SERPL-MCNC: 21 MG/DL (ref 6–23)
CALCIUM SERPL-MCNC: 8.6 MG/DL (ref 8.6–10.3)
CHLORIDE SERPL-SCNC: 105 MMOL/L (ref 98–107)
CHOLEST SERPL-MCNC: 202 MG/DL (ref 0–199)
CHOLESTEROL/HDL RATIO: 2.1
CO2 SERPL-SCNC: 23 MMOL/L (ref 21–32)
COLOR UR: YELLOW
CREAT SERPL-MCNC: 0.84 MG/DL (ref 0.5–1.3)
CRP SERPL HS-MCNC: 4 MG/L
EGFRCR SERPLBLD CKD-EPI 2021: >90 ML/MIN/1.73M*2
EOSINOPHIL # BLD AUTO: 0.11 X10*3/UL (ref 0–0.7)
EOSINOPHIL NFR BLD AUTO: 1.8 %
ERYTHROCYTE [DISTWIDTH] IN BLOOD BY AUTOMATED COUNT: 12.9 % (ref 11.5–14.5)
EST. AVERAGE GLUCOSE BLD GHB EST-MCNC: 105 MG/DL
GLUCOSE SERPL-MCNC: 88 MG/DL (ref 74–99)
GLUCOSE UR STRIP.AUTO-MCNC: NEGATIVE MG/DL
HBA1C MFR BLD: 5.3 %
HCT VFR BLD AUTO: 44.7 % (ref 41–52)
HDLC SERPL-MCNC: 97.5 MG/DL
HGB BLD-MCNC: 15.2 G/DL (ref 13.5–17.5)
IMM GRANULOCYTES # BLD AUTO: 0.02 X10*3/UL (ref 0–0.7)
IMM GRANULOCYTES NFR BLD AUTO: 0.3 % (ref 0–0.9)
KETONES UR STRIP.AUTO-MCNC: ABNORMAL MG/DL
LDLC SERPL CALC-MCNC: 83 MG/DL
LEUKOCYTE ESTERASE UR QL STRIP.AUTO: NEGATIVE
LYMPHOCYTES # BLD AUTO: 1.95 X10*3/UL (ref 1.2–4.8)
LYMPHOCYTES NFR BLD AUTO: 31.1 %
MCH RBC QN AUTO: 31.5 PG (ref 26–34)
MCHC RBC AUTO-ENTMCNC: 34 G/DL (ref 32–36)
MCV RBC AUTO: 93 FL (ref 80–100)
MONOCYTES # BLD AUTO: 0.68 X10*3/UL (ref 0.1–1)
MONOCYTES NFR BLD AUTO: 10.8 %
MUCOUS THREADS #/AREA URNS AUTO: NORMAL /LPF
NEUTROPHILS # BLD AUTO: 3.48 X10*3/UL (ref 1.2–7.7)
NEUTROPHILS NFR BLD AUTO: 55.5 %
NITRITE UR QL STRIP.AUTO: NEGATIVE
NON HDL CHOLESTEROL: 105 MG/DL (ref 0–149)
NRBC BLD-RTO: 0 /100 WBCS (ref 0–0)
PH UR STRIP.AUTO: 5 [PH]
PLATELET # BLD AUTO: 146 X10*3/UL (ref 150–450)
POTASSIUM SERPL-SCNC: 4 MMOL/L (ref 3.5–5.3)
PROT SERPL-MCNC: 7 G/DL (ref 6.4–8.2)
PROT UR STRIP.AUTO-MCNC: ABNORMAL MG/DL
PSA SERPL-MCNC: <0.1 NG/ML
RBC # BLD AUTO: 4.83 X10*6/UL (ref 4.5–5.9)
RBC # UR STRIP.AUTO: NEGATIVE /UL
RBC #/AREA URNS AUTO: NORMAL /HPF
SODIUM SERPL-SCNC: 139 MMOL/L (ref 136–145)
SP GR UR STRIP.AUTO: 1.03
TRIGL SERPL-MCNC: 106 MG/DL (ref 0–149)
TSH SERPL-ACNC: 0.8 MIU/L (ref 0.44–3.98)
UROBILINOGEN UR STRIP.AUTO-MCNC: <2 MG/DL
VLDL: 21 MG/DL (ref 0–40)
WBC # BLD AUTO: 6.3 X10*3/UL (ref 4.4–11.3)
WBC #/AREA URNS AUTO: NORMAL /HPF

## 2024-01-15 PROCEDURE — 84443 ASSAY THYROID STIM HORMONE: CPT

## 2024-01-15 PROCEDURE — 80053 COMPREHEN METABOLIC PANEL: CPT

## 2024-01-15 PROCEDURE — 82306 VITAMIN D 25 HYDROXY: CPT

## 2024-01-15 PROCEDURE — 86141 C-REACTIVE PROTEIN HS: CPT

## 2024-01-15 PROCEDURE — 80061 LIPID PANEL: CPT

## 2024-01-15 PROCEDURE — 84153 ASSAY OF PSA TOTAL: CPT

## 2024-01-15 PROCEDURE — 85025 COMPLETE CBC W/AUTO DIFF WBC: CPT

## 2024-01-15 PROCEDURE — 36415 COLL VENOUS BLD VENIPUNCTURE: CPT

## 2024-01-15 PROCEDURE — 81001 URINALYSIS AUTO W/SCOPE: CPT

## 2024-01-15 PROCEDURE — 83036 HEMOGLOBIN GLYCOSYLATED A1C: CPT

## 2024-01-16 ENCOUNTER — OFFICE VISIT (OUTPATIENT)
Dept: PRIMARY CARE | Facility: CLINIC | Age: 69
End: 2024-01-16
Payer: COMMERCIAL

## 2024-01-16 VITALS
OXYGEN SATURATION: 94 % | HEART RATE: 88 BPM | SYSTOLIC BLOOD PRESSURE: 116 MMHG | DIASTOLIC BLOOD PRESSURE: 64 MMHG | BODY MASS INDEX: 32.5 KG/M2 | HEIGHT: 70 IN | WEIGHT: 227 LBS

## 2024-01-16 DIAGNOSIS — J45.30 MILD PERSISTENT ASTHMA WITHOUT COMPLICATION (HHS-HCC): ICD-10-CM

## 2024-01-16 DIAGNOSIS — E78.5 DYSLIPIDEMIA: ICD-10-CM

## 2024-01-16 DIAGNOSIS — Z12.11 COLON CANCER SCREENING: ICD-10-CM

## 2024-01-16 DIAGNOSIS — Z00.00 WELLNESS EXAMINATION: Primary | ICD-10-CM

## 2024-01-16 DIAGNOSIS — R82.90 ABNORMAL URINALYSIS: ICD-10-CM

## 2024-01-16 DIAGNOSIS — I77.810 ASCENDING AORTA DILATATION (CMS-HCC): ICD-10-CM

## 2024-01-16 DIAGNOSIS — D69.6 THROMBOCYTOPENIA (CMS-HCC): ICD-10-CM

## 2024-01-16 DIAGNOSIS — E55.9 VITAMIN D DEFICIENCY: ICD-10-CM

## 2024-01-16 DIAGNOSIS — I10 ESSENTIAL HYPERTENSION: ICD-10-CM

## 2024-01-16 DIAGNOSIS — G47.33 OSA (OBSTRUCTIVE SLEEP APNEA): ICD-10-CM

## 2024-01-16 DIAGNOSIS — J30.9 ALLERGIC RHINITIS, UNSPECIFIED SEASONALITY, UNSPECIFIED TRIGGER: ICD-10-CM

## 2024-01-16 DIAGNOSIS — E66.09 CLASS 1 OBESITY DUE TO EXCESS CALORIES WITHOUT SERIOUS COMORBIDITY WITH BODY MASS INDEX (BMI) OF 32.0 TO 32.9 IN ADULT: ICD-10-CM

## 2024-01-16 DIAGNOSIS — Z85.46 HISTORY OF PROSTATE CANCER: ICD-10-CM

## 2024-01-16 DIAGNOSIS — D12.6 TUBULAR ADENOMA OF COLON: ICD-10-CM

## 2024-01-16 DIAGNOSIS — I35.1 MILD AORTIC REGURGITATION: ICD-10-CM

## 2024-01-16 PROBLEM — D48.5 NEOPLASM OF UNCERTAIN BEHAVIOR OF SKIN: Status: RESOLVED | Noted: 2023-04-11 | Resolved: 2024-01-16

## 2024-01-16 PROBLEM — C44.519 BASAL CELL CARCINOMA OF SKIN OF TRUNK, EXCEPT SCROTUM: Status: RESOLVED | Noted: 2023-04-11 | Resolved: 2024-01-16

## 2024-01-16 PROBLEM — J45.909 ASTHMA (HHS-HCC): Status: RESOLVED | Noted: 2023-09-28 | Resolved: 2024-01-16

## 2024-01-16 PROBLEM — M25.511 RIGHT SHOULDER PAIN: Status: RESOLVED | Noted: 2023-09-28 | Resolved: 2024-01-16

## 2024-01-16 PROBLEM — Z85.828 PERSONAL HISTORY OF OTHER MALIGNANT NEOPLASM OF SKIN: Status: RESOLVED | Noted: 2023-04-11 | Resolved: 2024-01-16

## 2024-01-16 PROBLEM — D49.9 NEOPLASM OF UNSPECIFIED BEHAVIOR OF UNSPECIFIED SITE: Status: RESOLVED | Noted: 2023-04-11 | Resolved: 2024-01-16

## 2024-01-16 PROBLEM — M25.562 LEFT KNEE PAIN: Status: RESOLVED | Noted: 2023-09-28 | Resolved: 2024-01-16

## 2024-01-16 PROBLEM — C44.41 BASAL CELL CARCINOMA OF SKIN OF SCALP AND NECK: Status: RESOLVED | Noted: 2023-04-11 | Resolved: 2024-01-16

## 2024-01-16 PROBLEM — L81.4 OTHER MELANIN HYPERPIGMENTATION: Status: RESOLVED | Noted: 2023-04-11 | Resolved: 2024-01-16

## 2024-01-16 PROBLEM — L56.8 OTHER SPECIFIED ACUTE SKIN CHANGES DUE TO ULTRAVIOLET RADIATION: Status: RESOLVED | Noted: 2023-04-11 | Resolved: 2024-01-16

## 2024-01-16 PROBLEM — L91.8 OTHER HYPERTROPHIC DISORDERS OF THE SKIN: Status: RESOLVED | Noted: 2023-04-11 | Resolved: 2024-01-16

## 2024-01-16 PROBLEM — D36.7 BENIGN NEOPLASM OF TRUNK: Status: RESOLVED | Noted: 2023-04-11 | Resolved: 2024-01-16

## 2024-01-16 PROBLEM — E66.811 CLASS 1 OBESITY DUE TO EXCESS CALORIES WITHOUT SERIOUS COMORBIDITY WITH BODY MASS INDEX (BMI) OF 32.0 TO 32.9 IN ADULT: Status: ACTIVE | Noted: 2024-01-16

## 2024-01-16 PROBLEM — Z80.0 FAMILY HISTORY OF COLON CANCER IN FATHER: Status: ACTIVE | Noted: 2024-01-16

## 2024-01-16 PROBLEM — L90.5 SCAR CONDITION AND FIBROSIS OF SKIN: Status: RESOLVED | Noted: 2023-04-11 | Resolved: 2024-01-16

## 2024-01-16 PROBLEM — M25.561 RIGHT KNEE PAIN: Status: RESOLVED | Noted: 2023-09-28 | Resolved: 2024-01-16

## 2024-01-16 PROBLEM — K43.9 EPIGASTRIC HERNIA: Status: RESOLVED | Noted: 2023-09-28 | Resolved: 2024-01-16

## 2024-01-16 PROBLEM — K42.9 UMBILICAL HERNIA WITHOUT OBSTRUCTION AND WITHOUT GANGRENE: Status: RESOLVED | Noted: 2023-10-26 | Resolved: 2024-01-16

## 2024-01-16 PROBLEM — D22.62 MELANOCYTIC NEVI OF LEFT UPPER LIMB, INCLUDING SHOULDER: Status: RESOLVED | Noted: 2023-04-11 | Resolved: 2024-01-16

## 2024-01-16 PROBLEM — C44.612 BASAL CELL CARCINOMA OF SKIN OF RIGHT UPPER LIMB, INCLUDING SHOULDER: Status: RESOLVED | Noted: 2023-04-11 | Resolved: 2024-01-16

## 2024-01-16 PROBLEM — J06.9 UPPER RESPIRATORY INFECTION: Status: RESOLVED | Noted: 2023-09-28 | Resolved: 2024-01-16

## 2024-01-16 PROBLEM — L90.5 SCAR: Status: RESOLVED | Noted: 2023-04-11 | Resolved: 2024-01-16

## 2024-01-16 PROBLEM — R39.198 DIFFICULTY URINATING: Status: RESOLVED | Noted: 2023-09-28 | Resolved: 2024-01-16

## 2024-01-16 PROBLEM — L82.1 OTHER SEBORRHEIC KERATOSIS: Status: RESOLVED | Noted: 2023-04-11 | Resolved: 2024-01-16

## 2024-01-16 PROBLEM — D22.70 MELANOCYTIC NEVI OF LOWER LIMB, INCLUDING HIP: Status: RESOLVED | Noted: 2023-04-11 | Resolved: 2024-01-16

## 2024-01-16 PROCEDURE — 1036F TOBACCO NON-USER: CPT | Performed by: INTERNAL MEDICINE

## 2024-01-16 PROCEDURE — 3008F BODY MASS INDEX DOCD: CPT | Performed by: INTERNAL MEDICINE

## 2024-01-16 PROCEDURE — 1160F RVW MEDS BY RX/DR IN RCRD: CPT | Performed by: INTERNAL MEDICINE

## 2024-01-16 PROCEDURE — 1159F MED LIST DOCD IN RCRD: CPT | Performed by: INTERNAL MEDICINE

## 2024-01-16 PROCEDURE — 3078F DIAST BP <80 MM HG: CPT | Performed by: INTERNAL MEDICINE

## 2024-01-16 PROCEDURE — 3074F SYST BP LT 130 MM HG: CPT | Performed by: INTERNAL MEDICINE

## 2024-01-16 PROCEDURE — UHSPHYS PR UH SELECT PHYSICAL: Performed by: INTERNAL MEDICINE

## 2024-01-16 PROCEDURE — 1126F AMNT PAIN NOTED NONE PRSNT: CPT | Performed by: INTERNAL MEDICINE

## 2024-01-16 RX ORDER — LORATADINE 10 MG/1
10 TABLET ORAL DAILY
Qty: 30 TABLET | Refills: 2
Start: 2024-01-16 | End: 2024-05-17 | Stop reason: DRUGHIGH

## 2024-01-16 RX ORDER — FLUTICASONE PROPIONATE 50 MCG
2 SPRAY, SUSPENSION (ML) NASAL NIGHTLY
Qty: 16 G | Refills: 11
Start: 2024-01-16 | End: 2025-01-15

## 2024-01-16 RX ORDER — ACETAMINOPHEN 500 MG
5000 TABLET ORAL DAILY
Start: 2024-01-16 | End: 2024-02-29 | Stop reason: ALTCHOICE

## 2024-01-16 NOTE — PROGRESS NOTES
Subjective   Patient ID: Zak Brizuela is a 68 y.o. male who presents for Annual Exam.    Wellness exam/physical  (Patient presents today with his wife, Cassidy)    Essential hypertension  Losartan started at recent visit 11/30 with [facility] cardiology, Dr. Leonard.  Home blood pressure log has been brought to this visit and is reviewed with blood pressures most recently ranging between 110-135/65-75.    Mild aortic regurgitation  Dyslipidemia  Ascending aorta dilation  Recent echo notable for aortic regurgitation and ascending aorta dilation.  Importance of blood pressure control as well as cholesterol-lowering discussed by cardiology at visit  Atorvastatin started 1 month ago  Proper diet reviewed, with room for improvement.  Importance of exercise reviewed which patient is doing.    Obesity (BMI 32.6)  Lifestyle measures reviewed.  Exercising regularly as noted below.  There is room for improvement in diet.  Currently 2 glasses of wine per day.  Patient interested in additional treatment for weight management.  Previously seen at [facility] endocrinology weight management clinic, phentermine recommended, if approved by [facility] cardiology (reason for recent cardiology consult, cardiology did not recommend)    Mild persistent asthma  Allergic rhinitis  Fairly well-controlled asthma without need for albuterol inhaler.  He continues to have chronic cough with associated postnasal drip.  His wife, and some persistence of his cough.  No current allergy medication taken.  Allergies worse in the springtime per patient report.  No allergy testing.    Obstructive sleep apnea  Home sleep study last July 2023.  Current treatment is over-the-counter oral appliance.  Effectiveness is in question as device often follows and wife notes he is still snoring.    Vitamin D deficiency  Screening vitamin D level is low at 16.  Vitamin D supplementation to be initiated.    History of colon polyps  Screening colonoscopy is due in May 2024.      Abnormal  "urinalysis  Quality of urine collection in question, repeat sample requested.  Repeat midstream urinalysis    History of prostate cancer  PSA remains undetectable.  He follows with Dr. De La Rosa, [facility] urology    Umbilical hernia status post recent repair in December  Patient states that he is feeling well    Health maintenance  Exercise: Racquet sports 3 days a week  Ophthalmology: Up-to-date with Dr. Justin Phan  Dentistry: Up-to-date    Social   and lives with wife and daughters  Self-employed           Review of Systems   Constitutional:  Negative for fatigue.   HENT:  Positive for postnasal drip.    Respiratory:  Positive for cough. Negative for shortness of breath and wheezing.    Cardiovascular:  Negative for chest pain, palpitations and leg swelling.   Gastrointestinal:  Negative for abdominal pain and constipation.   Genitourinary:  Negative for dysuria and hematuria.   Musculoskeletal:  Negative for arthralgias and back pain.   Neurological:  Negative for dizziness, light-headedness and headaches.   Psychiatric/Behavioral:  Negative for dysphoric mood and sleep disturbance.        Objective   /64 (BP Location: Left arm, Patient Position: Sitting, BP Cuff Size: Adult)   Pulse 88   Ht 1.778 m (5' 10\")   Wt 103 kg (227 lb)   SpO2 94%   BMI 32.57 kg/m²     Physical Exam  Vitals reviewed.   Constitutional:       Appearance: Normal appearance.   HENT:      Head: Normocephalic.      Right Ear: Tympanic membrane normal.      Left Ear: Tympanic membrane normal.   Eyes:      Conjunctiva/sclera: Conjunctivae normal.      Pupils: Pupils are equal, round, and reactive to light.   Cardiovascular:      Rate and Rhythm: Normal rate and regular rhythm.   Pulmonary:      Effort: Pulmonary effort is normal.      Breath sounds: Normal breath sounds.   Abdominal:      General: Bowel sounds are normal. There is no distension.      Tenderness: There is no abdominal tenderness.   Musculoskeletal:      Right lower " leg: No edema.      Left lower leg: No edema.   Neurological:      General: No focal deficit present.      Mental Status: He is alert.   Psychiatric:         Mood and Affect: Mood normal.         Assessment/Plan     Wellness exam/physical  Regular exercise with goal of 120-150 minutes/week  Continued routine follow-up with ophthalmology and dentistry recommended    Essential hypertension  Continue losartan 50 mg daily  Low-salt diet and regular exercise recommended  Bring additional blood pressure readings (and blood pressure device to check accuracy) to next visit    Mild aortic regurgitation  Dyslipidemia  Ascending aorta dilation  Lipid panel reviewed and values are improved on treatment  Continue atorvastatin 20 mg daily, well-balanced diet, exercise, and efforts at weight loss  Referral to  cardiology    Obesity (BMI 32.6)  Continued regular exercise recommended  Low carbohydrate/low sugar diet and reduced alcohol intake recommended  Referral to nutrition for consultation  Referral to endocrinology weight management clinic    Mild persistent asthma, chronic cough  Allergic rhinitis  Continue Symbicort and albuterol inhaler as needed  Start over-the-counter Claritin 10 mg each morning  Start over-the-counter Flonase, 2 sprays in each nostril at bedtime  Referral to  pulmonary medicine    Obstructive sleep apnea  Continue use of mouth appliance for now  Referral to  sleep medicine for treatment recommendations    Vitamin D deficiency  Start over-the-counter vitamin D 5000 IU daily (with food)  Vitamin D lab work to be checked prior to next visit    History of colon polyps  Screening colonoscopy in May 2024.  Referral to  gastroenterology placed    History of skin cancer  Continue routine dermatology follow-up    History of prostate cancer (PSA undetectable)  Continue routine follow-up with Dr. De La Rosa    Abnormal urinalysis  Repeat midstream urinalysis    Follow-up  Office visit in May 2024  (Additional will  be ordered to complete 3 to 7 days prior)    Hang Dugan MD

## 2024-01-16 NOTE — Clinical Note
Bill, This is one of my [facility] patients, recently established with [facility] cardiology (Dr. Leonard), started on hypertension and statin therapy. Also with mild AI and ascending aorta dilation. He would like to transfer his specialty care, including cardiology, to . I am checking to see if you would be able to see him. Thank you, Geovanny

## 2024-01-16 NOTE — PATIENT INSTRUCTIONS
Wellness exam/physical  Regular exercise with goal of 120-150 minutes/week  Continued routine follow-up with ophthalmology and dentistry recommended    Essential hypertension  Continue losartan 50 mg daily  Low-salt diet and regular exercise recommended  Bring additional blood pressure readings (and blood pressure device to check accuracy) to next visit    Mild aortic regurgitation  Dyslipidemia  Ascending aorta dilation  Lipid panel reviewed and values are improved on treatment  Continue atorvastatin 20 mg daily, well-balanced diet, exercise, and efforts at weight loss  Referral to  cardiology    Obesity (BMI 32.6)  Continued regular exercise recommended  Low carbohydrate/low sugar diet and reduced alcohol intake recommended  Referral to nutrition for consultation  Referral to endocrinology weight management clinic    Mild persistent asthma, chronic cough  Allergic rhinitis  Continue Symbicort and albuterol inhaler as needed  Start over-the-counter Claritin 10 mg each morning  Start over-the-counter Flonase, 2 sprays in each nostril at bedtime  Referral to  pulmonary medicine    Obstructive sleep apnea  Continue use of mouth appliance for now  Referral to  sleep medicine for treatment recommendations    Vitamin D deficiency  Start over-the-counter vitamin D 5000 IU daily (with food)  Vitamin D lab work to be checked prior to next visit    History of colon polyps  Screening colonoscopy in May 2024.  Referral to  gastroenterology placed    Abnormal urinalysis  Repeat midstream urinalysis    Follow-up  Office visit in May 2024  (Additional will be ordered to complete 3 to 7 days prior)

## 2024-01-17 ASSESSMENT — ENCOUNTER SYMPTOMS
CONSTIPATION: 0
DYSURIA: 0
HEADACHES: 0
FATIGUE: 0
DIZZINESS: 0
DYSPHORIC MOOD: 0
SLEEP DISTURBANCE: 0
LIGHT-HEADEDNESS: 0
BACK PAIN: 0
HEMATURIA: 0
SHORTNESS OF BREATH: 0
ABDOMINAL PAIN: 0
WHEEZING: 0
PALPITATIONS: 0
COUGH: 1
ARTHRALGIAS: 0

## 2024-01-23 ENCOUNTER — HOSPITAL ENCOUNTER (OUTPATIENT)
Dept: RADIOLOGY | Facility: CLINIC | Age: 69
Discharge: HOME | End: 2024-01-23
Payer: COMMERCIAL

## 2024-01-23 ENCOUNTER — OFFICE VISIT (OUTPATIENT)
Dept: ORTHOPEDIC SURGERY | Facility: CLINIC | Age: 69
End: 2024-01-23
Payer: COMMERCIAL

## 2024-01-23 VITALS — BODY MASS INDEX: 33.21 KG/M2 | HEIGHT: 70 IN | WEIGHT: 232 LBS

## 2024-01-23 DIAGNOSIS — M17.11 PRIMARY OSTEOARTHRITIS OF RIGHT KNEE: Primary | ICD-10-CM

## 2024-01-23 DIAGNOSIS — M25.561 RIGHT KNEE PAIN, UNSPECIFIED CHRONICITY: ICD-10-CM

## 2024-01-23 PROCEDURE — 3008F BODY MASS INDEX DOCD: CPT | Performed by: ORTHOPAEDIC SURGERY

## 2024-01-23 PROCEDURE — 1126F AMNT PAIN NOTED NONE PRSNT: CPT | Performed by: ORTHOPAEDIC SURGERY

## 2024-01-23 PROCEDURE — 1159F MED LIST DOCD IN RCRD: CPT | Performed by: ORTHOPAEDIC SURGERY

## 2024-01-23 PROCEDURE — 1160F RVW MEDS BY RX/DR IN RCRD: CPT | Performed by: ORTHOPAEDIC SURGERY

## 2024-01-23 PROCEDURE — 73562 X-RAY EXAM OF KNEE 3: CPT | Mod: RT

## 2024-01-23 PROCEDURE — 73562 X-RAY EXAM OF KNEE 3: CPT | Mod: RIGHT SIDE | Performed by: RADIOLOGY

## 2024-01-23 PROCEDURE — 99214 OFFICE O/P EST MOD 30 MIN: CPT | Performed by: ORTHOPAEDIC SURGERY

## 2024-01-23 PROCEDURE — 1036F TOBACCO NON-USER: CPT | Performed by: ORTHOPAEDIC SURGERY

## 2024-01-23 ASSESSMENT — PAIN DESCRIPTION - DESCRIPTORS: DESCRIPTORS: SHARP

## 2024-01-23 ASSESSMENT — PAIN SCALES - GENERAL: PAINLEVEL_OUTOF10: 7

## 2024-01-23 ASSESSMENT — PAIN - FUNCTIONAL ASSESSMENT: PAIN_FUNCTIONAL_ASSESSMENT: 0-10

## 2024-01-23 NOTE — LETTER
January 28, 2024     Hang Dugan MD  [address]  [address]  [facility] [address]    Patient: Zak Brizuela   YOB: 1955   Date of Visit: 1/23/2024       Dear Dr. Hang Dugan MD:    Thank you for referring Zak Brizuela to me for evaluation. Below are my notes for this consultation.  If you have questions, please do not hesitate to call me. I look forward to following your patient along with you.       Sincerely,     Faustino Upton MD      CC: No Recipients  ______________________________________________________________________________________    Chief complaint is right knee pain and swelling  Active and racquet sports.  Some mechanical symptoms.  Past medical,family and social histories have been reviewed and are up to date.  All other body systems have been reviewed and are negative for complaint.  Constitutional: Well-developed well-nourished   Eyes: Sclerae anicteric, pupils equal and round  HENT: Normocephalic atraumatic  Cardiovascular: Pulses full, regular rate and rhythm  Respiratory: Breathing not labored, no wheezing  Integumentary: Skin intact, no lesions or rashes  Neurological: Sensation intact, no gross strength deficits, reflexes equal  Psychiatric: Alert oriented and appropriate  Hematologic/lymphatic: No lymphadenopathy  Right knee: Moderate effusion tender medial joint line mild crepitus no gross instability satisfactory mobility  X-rays reviewed mild degenerative changes assessment degenerative effusion  Aspirated and injected knee today discussed activity modification discussed natural history of arthritis  All questions answered  L Inj/Asp on 1/28/2024 2:36 PM  Indications: pain and joint swelling  Details: 21 G needle, lateral approach  Medications: 40 mg methylPREDNISolone acetate 40 mg/mL; 2 mL lidocaine 20 mg/mL (2 %)  Aspirate: 20 mL yellow

## 2024-01-25 ENCOUNTER — APPOINTMENT (OUTPATIENT)
Dept: ORTHOPEDIC SURGERY | Facility: CLINIC | Age: 69
End: 2024-01-25
Payer: COMMERCIAL

## 2024-01-28 RX ORDER — METHYLPREDNISOLONE ACETATE 40 MG/ML
40 INJECTION, SUSPENSION INTRA-ARTICULAR; INTRALESIONAL; INTRAMUSCULAR; SOFT TISSUE
Status: COMPLETED | OUTPATIENT
Start: 2024-01-28 | End: 2024-01-28

## 2024-01-28 RX ORDER — LIDOCAINE HYDROCHLORIDE 20 MG/ML
2 INJECTION, SOLUTION INFILTRATION; PERINEURAL
Status: COMPLETED | OUTPATIENT
Start: 2024-01-28 | End: 2024-01-28

## 2024-01-28 RX ADMIN — METHYLPREDNISOLONE ACETATE 40 MG: 40 INJECTION, SUSPENSION INTRA-ARTICULAR; INTRALESIONAL; INTRAMUSCULAR; SOFT TISSUE at 14:36

## 2024-01-28 RX ADMIN — LIDOCAINE HYDROCHLORIDE 2 ML: 20 INJECTION, SOLUTION INFILTRATION; PERINEURAL at 14:36

## 2024-01-28 NOTE — PROGRESS NOTES
Chief complaint is right knee pain and swelling  Active and racquet sports.  Some mechanical symptoms.  Past medical,family and social histories have been reviewed and are up to date.  All other body systems have been reviewed and are negative for complaint.  Constitutional: Well-developed well-nourished   Eyes: Sclerae anicteric, pupils equal and round  HENT: Normocephalic atraumatic  Cardiovascular: Pulses full, regular rate and rhythm  Respiratory: Breathing not labored, no wheezing  Integumentary: Skin intact, no lesions or rashes  Neurological: Sensation intact, no gross strength deficits, reflexes equal  Psychiatric: Alert oriented and appropriate  Hematologic/lymphatic: No lymphadenopathy  Right knee: Moderate effusion tender medial joint line mild crepitus no gross instability satisfactory mobility  X-rays reviewed mild degenerative changes assessment degenerative effusion  Aspirated and injected knee today discussed activity modification discussed natural history of arthritis  All questions answered  L Inj/Asp on 1/28/2024 2:36 PM  Indications: pain and joint swelling  Details: 21 G needle, lateral approach  Medications: 40 mg methylPREDNISolone acetate 40 mg/mL; 2 mL lidocaine 20 mg/mL (2 %)  Aspirate: 20 mL yellow

## 2024-02-01 ENCOUNTER — NUTRITION (OUTPATIENT)
Dept: PRIMARY CARE | Facility: CLINIC | Age: 69
End: 2024-02-01
Payer: COMMERCIAL

## 2024-02-01 VITALS — BODY MASS INDEX: 33.21 KG/M2 | HEIGHT: 70 IN | WEIGHT: 232 LBS

## 2024-02-01 DIAGNOSIS — E66.09 CLASS 1 OBESITY DUE TO EXCESS CALORIES WITHOUT SERIOUS COMORBIDITY WITH BODY MASS INDEX (BMI) OF 32.0 TO 32.9 IN ADULT: ICD-10-CM

## 2024-02-01 PROCEDURE — NUTCO NUTRITION CONSULTATION: Performed by: DIETITIAN, REGISTERED

## 2024-02-01 NOTE — PATIENT INSTRUCTIONS
1) To help create well balanced meals while being mindful of portion sizes, use the plate model for portioning out your meals.  Fill 1/2 of your plate with non-starchy vegetables, 1/4 of your plate with lean protein (~5-6oz per meal), and 1/4 of your plate with complex carbohydrates/whole grains.  Each meal should contain the following 3 components: protein + fiber + healthy fats.    2) Aim for 35-40g fiber daily.  One way to help you reach this goal is to include non-starchy vegetables with both lunch and dinner (at least 1-2 cups).  Be sure to include a wide variety of colorful vegetables throughout the week.  Also, be sure to use 100% whole wheat/whole grain breads/pastas, brown/wild rice, quinoa, oats, beans, lentils, starchy vegetables-potatoes, sweet potatoes, corn, peas, winter squash and limit/avoid white, processed carbohydrates (white bread, white pasta, white rice, etc).    3) Choose 3 out of 5 of the following daily to help you reach your daily fiber goals:  -1 cup berries (4-5g fiber)  -1/2 cup beans (7g fiber)  -1/4 cup nuts (5g fiber)  -1/3 avocado (4g fiber)  -3 cups greens (5g fiber)    4) Ensure you are getting adequate amounts of protein consistently throughout the day.  Your daily protein goal is 130-150g.  Aim for 40-50g per meal and include 10-15g protein at snacks.    5) Options for increasing protein and fiber at breakfast:  -2 eggs (provides 12-14g protein) + chicken sausage ([employer], 3 small links provides 10g protein)  -Greek yogurt + 1/2 cup Carli Crunch cereal + nuts or seeds  -cottage cheese + fruit + handful of nuts  -OWYN Pro Elite pre-made protein drink (32g protein per bottle) + fruit + nuts  -homemade smoothie made with 2 scoops protein powder (recommended brands: Be Well by Elle, Tod's Whey, Naked Whey, Ka'Vic, Margret, OWYN) + fruit (3/4 cup) + greens + healthy fat (nut butter, seeds, avocado)    6) Increase your fluid intake to at least 60oz a day.  Aim to  drink 16oz of water with each meal.    7) Incorporate a psyllium husk fiber supplement daily (mixed in water).    8) Incorporate strength training 2-3 days a week.  Consider resuming your personal training sessions.

## 2024-02-01 NOTE — PROGRESS NOTES
"Nutrition Assessment     Reason for Visit:  It was a pleasure meeting Mr. Brizuela today to discuss diet and nutrition as part of the  [facility] program.  His past medical history includes high blood pressure, high cholesterol, obesity, asthma, obstructive sleep apnea.    Anthropometrics:  Anthropometrics  Height: 177.8 cm (5' 10\")  Weight: 105 kg (232 lb)  BMI (Calculated): 33.29  DBW: 190lbs       Food And Nutrient Intake:  Food and Nutrient History  Food and Nutrient History: He reports eating 2-3 meals a day.  He is not a big breakfast eater.  He will eat something small for this meal such as banana or toast.  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  Lunch is unplanned.  He does not pack lunch.  He may miss lunch 1-2 days a week.  Other days he is eating out for this meal.  He cooks dinner 2-3 times a week.  The other days it is take out.  He does not generally snack, but if he does it may be fruit.  He may consume 4-6oz of red meat 1-2 times a week.  He is eating fish 3-4 times a week-salmon, tilapia, sea bass.  Fluid Intake: Water-12-30oz a day; coffee-1 cup 4-5 days a week (black); aparkling water-on occasion; alcohol-7 days a week, 2 drinks a day  Food Allergy: None  Food Intolerance: kiwi  GI Symptoms: none (He has a bowel movement everyday.)  Sleep Duration/Quality:  (6-8 hours a night; he wears an oral device for light sleep apnea (started 3-4 months ago))     Supplements: Vitamin D 5000IUs/day    24 Hour Food Recall  B: 1 piece sourdough toast, banana, mandarin orange  L: chicken sandwich-lettuce, tomato, onion, on a roll; small bag of chips; lemonade  D: Chipotle-burrito with brown rice, beans, guacamole, lettuce, tomato, shredded beef    Physical Activities:  Physical Activity  Physical Activity History: It has been 10 years since he has incorporated strength training as part of his exercise regimen.  Type of Physical Activity: tennis 3-4 times a week, 90 " "minutes      Energy Needs  Calculated Energy Needs Using Equations  Height: 177.8 cm (5' 10\")  Weight Used for Equation Calculations: 105 kg (232 lb)  Allen Freedman Equation (Overweight or Obese Patients): 1834  Equation Chosen to Use by RD: Caryn Caal  Activity Factor: 1.4  Total Energy Needs: 2570  Calories for Weight Loss: 0291-6827  Protein Needs: 130-150g/day (0.7-0.8g/lb DBW, 190lbs)      Nutrition Diagnosis      Nutrition Diagnosis  Patient has Nutrition Diagnosis: Yes  Diagnosis Status (1): New  Nutrition Diagnosis 1: Inadequate fiber intake  Related to (1): food- and nutrition-related knowledge deficit regarding appropriate fiber intake  As Evidenced by (1): food recall showing he is not meeting the recommended 35-40g fiber per day  Additional Nutrition Diagnosis: Diagnosis 2  Diagnosis Status (2): New  Nutrition Diagnosis 2: Inadequate protein intake  Related to (2): food- and nutrition-related knowledge deficit regarding appropriate protein intake  As Evidenced by (2): food recall showing he is not meeting the recommended 130-150g protein per day    Nutrition Interventions/Recommendations   Food and Nutrition Delivery  Meals & Snacks: Energy-modified diet, Fiber-modified diet, Fluid-modified diet, Modify Composition of Meals/Snacks, Protein-modified diet        Patient Instructions   1) To help create well balanced meals while being mindful of portion sizes, use the plate model for portioning out your meals.  Fill 1/2 of your plate with non-starchy vegetables, 1/4 of your plate with lean protein (~5-6oz per meal), and 1/4 of your plate with complex carbohydrates/whole grains.  Each meal should contain the following 3 components: protein + fiber + healthy fats.    2) Aim for 35-40g fiber daily.  One way to help you reach this goal is to include non-starchy vegetables with both lunch and dinner (at least 1-2 cups).  Be sure to include a wide variety of colorful vegetables throughout the week.  " Also, be sure to use 100% whole wheat/whole grain breads/pastas, brown/wild rice, quinoa, oats, beans, lentils, starchy vegetables-potatoes, sweet potatoes, corn, peas, winter squash and limit/avoid white, processed carbohydrates (white bread, white pasta, white rice, etc).    3) Choose 3 out of 5 of the following daily to help you reach your daily fiber goals:  -1 cup berries (4-5g fiber)  -1/2 cup beans (7g fiber)  -1/4 cup nuts (5g fiber)  -1/3 avocado (4g fiber)  -3 cups greens (5g fiber)    4) Ensure you are getting adequate amounts of protein consistently throughout the day.  Your daily protein goal is 130-150g.  Aim for 40-50g per meal and include 10-15g protein at snacks.    5) Options for increasing protein and fiber at breakfast:  -2 eggs (provides 12-14g protein) + chicken sausage ([employer], 3 small links provides 10g protein)  -Greek yogurt + 1/2 cup Carli Crunch cereal + nuts or seeds  -cottage cheese + fruit + handful of nuts  -OWYN Pro Elite pre-made protein drink (32g protein per bottle) + fruit + nuts  -homemade smoothie made with 2 scoops protein powder (recommended brands: Be Well by Elle, Tod's Whey, Naked Whey, Ka'Vic, Ascent, OWYN) + fruit (3/4 cup) + greens + healthy fat (nut butter, seeds, avocado)    6) Increase your fluid intake to at least 60oz a day.  Aim to drink 16oz of water with each meal.    7) Incorporate a psyllium husk fiber supplement daily (mixed in water).    8) Incorporate strength training 2-3 days a week.  Consider resuming your personal training sessions.     Nutrition Monitoring and Evaluation   Food/Nutrient Related History Monitoring  Monitoring and Evaluation Plan: Energy intake, Fluid intake, Protein intake, Fiber intake  Energy Intake: Estimated energy intake  Criteria: 2397-5947 calories a day for weight loss  Fluid Intake: Estimated fluid intake  Criteria: at least 60oz of water a day  Estimated protein intake: Estimated protein  intake  Criteria: 130-150g protein per day  Estimated fiber intake: Estimated fiber intake  Criteria: 35-40g fiber per day  Biochemical Data, Medical Tests and Procedures  Monitoring and Evaluation Plan: Lipid profile

## 2024-02-05 ENCOUNTER — APPOINTMENT (OUTPATIENT)
Dept: DERMATOLOGY | Facility: HOSPITAL | Age: 69
End: 2024-02-05
Payer: COMMERCIAL

## 2024-02-05 ENCOUNTER — TELEPHONE (OUTPATIENT)
Dept: PRIMARY CARE | Facility: CLINIC | Age: 69
End: 2024-02-05
Payer: COMMERCIAL

## 2024-02-05 ENCOUNTER — TELEPHONE (OUTPATIENT)
Dept: DERMATOLOGY | Facility: CLINIC | Age: 69
End: 2024-02-05
Payer: COMMERCIAL

## 2024-02-05 NOTE — TELEPHONE ENCOUNTER
Attempted to call pt back 2x about a sooner appt request . Pt hung up or was disconnected , unable to leave a message at this time

## 2024-02-06 DIAGNOSIS — E66.09 CLASS 1 OBESITY DUE TO EXCESS CALORIES WITHOUT SERIOUS COMORBIDITY WITH BODY MASS INDEX (BMI) OF 32.0 TO 32.9 IN ADULT: ICD-10-CM

## 2024-03-15 PROCEDURE — RXMED WILLOW AMBULATORY MEDICATION CHARGE

## 2024-03-20 ENCOUNTER — PHARMACY VISIT (OUTPATIENT)
Dept: PHARMACY | Facility: CLINIC | Age: 69
End: 2024-03-20
Payer: COMMERCIAL

## 2024-04-02 ENCOUNTER — APPOINTMENT (OUTPATIENT)
Dept: DERMATOLOGY | Facility: CLINIC | Age: 69
End: 2024-04-02
Payer: COMMERCIAL

## 2024-04-16 ENCOUNTER — APPOINTMENT (OUTPATIENT)
Dept: ENDOCRINOLOGY | Facility: CLINIC | Age: 69
End: 2024-04-16
Payer: COMMERCIAL

## 2024-05-15 ENCOUNTER — LAB (OUTPATIENT)
Dept: LAB | Facility: LAB | Age: 69
End: 2024-05-15
Payer: COMMERCIAL

## 2024-05-15 DIAGNOSIS — E55.9 VITAMIN D DEFICIENCY: ICD-10-CM

## 2024-05-15 DIAGNOSIS — E78.5 DYSLIPIDEMIA: ICD-10-CM

## 2024-05-15 DIAGNOSIS — D69.6 THROMBOCYTOPENIA (CMS-HCC): ICD-10-CM

## 2024-05-15 LAB
25(OH)D3 SERPL-MCNC: 57 NG/ML (ref 30–100)
ALBUMIN SERPL BCP-MCNC: 4.4 G/DL (ref 3.4–5)
ALP SERPL-CCNC: 56 U/L (ref 33–136)
ALT SERPL W P-5'-P-CCNC: 23 U/L (ref 10–52)
AST SERPL W P-5'-P-CCNC: 18 U/L (ref 9–39)
BASOPHILS # BLD AUTO: 0.03 X10*3/UL (ref 0–0.1)
BASOPHILS NFR BLD AUTO: 0.6 %
BILIRUB DIRECT SERPL-MCNC: 0.2 MG/DL (ref 0–0.3)
BILIRUB SERPL-MCNC: 0.7 MG/DL (ref 0–1.2)
CHOLEST SERPL-MCNC: 186 MG/DL (ref 0–199)
CHOLESTEROL/HDL RATIO: 2
EOSINOPHIL # BLD AUTO: 0.08 X10*3/UL (ref 0–0.7)
EOSINOPHIL NFR BLD AUTO: 1.7 %
ERYTHROCYTE [DISTWIDTH] IN BLOOD BY AUTOMATED COUNT: 12.3 % (ref 11.5–14.5)
HCT VFR BLD AUTO: 44.2 % (ref 41–52)
HDLC SERPL-MCNC: 91.7 MG/DL
HGB BLD-MCNC: 15.5 G/DL (ref 13.5–17.5)
IMM GRANULOCYTES # BLD AUTO: 0.01 X10*3/UL (ref 0–0.7)
IMM GRANULOCYTES NFR BLD AUTO: 0.2 % (ref 0–0.9)
LDLC SERPL CALC-MCNC: 59 MG/DL
LYMPHOCYTES # BLD AUTO: 1.81 X10*3/UL (ref 1.2–4.8)
LYMPHOCYTES NFR BLD AUTO: 39.1 %
MCH RBC QN AUTO: 31.2 PG (ref 26–34)
MCHC RBC AUTO-ENTMCNC: 35.1 G/DL (ref 32–36)
MCV RBC AUTO: 89 FL (ref 80–100)
MONOCYTES # BLD AUTO: 0.42 X10*3/UL (ref 0.1–1)
MONOCYTES NFR BLD AUTO: 9.1 %
NEUTROPHILS # BLD AUTO: 2.28 X10*3/UL (ref 1.2–7.7)
NEUTROPHILS NFR BLD AUTO: 49.3 %
NON HDL CHOLESTEROL: 94 MG/DL (ref 0–149)
NRBC BLD-RTO: 0 /100 WBCS (ref 0–0)
PLATELET # BLD AUTO: 188 X10*3/UL (ref 150–450)
PROT SERPL-MCNC: 7 G/DL (ref 6.4–8.2)
RBC # BLD AUTO: 4.97 X10*6/UL (ref 4.5–5.9)
TRIGL SERPL-MCNC: 178 MG/DL (ref 0–149)
VLDL: 36 MG/DL (ref 0–40)
WBC # BLD AUTO: 4.6 X10*3/UL (ref 4.4–11.3)

## 2024-05-15 PROCEDURE — 82306 VITAMIN D 25 HYDROXY: CPT

## 2024-05-15 PROCEDURE — 36415 COLL VENOUS BLD VENIPUNCTURE: CPT

## 2024-05-15 PROCEDURE — 85025 COMPLETE CBC W/AUTO DIFF WBC: CPT

## 2024-05-15 PROCEDURE — 80061 LIPID PANEL: CPT

## 2024-05-15 PROCEDURE — 80076 HEPATIC FUNCTION PANEL: CPT

## 2024-05-16 NOTE — PROGRESS NOTES
Subjective   Patient ID: Zak Brizueal is a 68 y.o. male who presents for Follow-up.    Routine follow-up    Essential hypertension  Blood pressure today is stable.  Patient is compliant with losartan.  No home blood pressure monitoring.  Lifestyle measures reviewed including proper diet and exercise     Mild aortic regurgitation  Dyslipidemia  Ascending aorta dilation  No chest pain or shortness of breath.  Panel results are at goal on current treatment, atorvastatin increased to 40 mg daily per cardiology.  Cardiac MRI is scheduled per [facility] cardiology, Dr. Leonard    Obesity   Patient recently started Zepbound 2.5 mg weekly, 2 doses today.  Mild nausea.  No other symptoms.  Weight is down 4 pounds since initiating treatment.  Upcoming endocrinology consult with Dr. Salinas scheduled on 7/3 and nutritionist on 7/8     Mild persistent asthma  Allergic rhinitis  Rhinitis symptoms have improved with initiation of Claritin and Flonase on a regular dosing schedule.  No coughing or wheezing, remains on Symbicort maintenance inhaler  New patient appointment with pulmonary medicine, Dr. Garcia, scheduled on 6/4     Obstructive sleep apnea  Patient is using oral appliance.  Previous referral to sleep medicine at  to initiate care, yet to be scheduled.     Vitamin D deficiency   Vitamin D at goal at 57, on vitamin D 5000 IU daily     History of colon polyps  Screening colonoscopy is scheduled on 5/29     History of skin cancer/melanoma  Dermatology appointment scheduled today     History of prostate cancer (PSA undetectable)  Recent visit with Dr. De La Rosa, [facility] urology, on 4/25.  No changes.  1 year follow-up recommended    Health maintenance  Exercising regularly with racquet sports, etc.  Hepatitis C screening lab work ordered to complete with next set of labs           Review of Systems   Constitutional:  Negative for fatigue.   HENT:  Negative for postnasal drip and rhinorrhea.    Respiratory:  Negative for cough and  shortness of breath.    Cardiovascular:  Negative for chest pain, palpitations and leg swelling.   Gastrointestinal:  Negative for constipation and diarrhea.   Neurological:  Negative for dizziness and headaches.       Objective   /70 (BP Location: Left arm, Patient Position: Sitting, BP Cuff Size: Adult)   Pulse 94   Wt 102 kg (225 lb)   SpO2 95%   BMI 32.28 kg/m²     Physical Exam  Vitals reviewed.   HENT:      Head: Normocephalic.      Mouth/Throat:      Mouth: Mucous membranes are moist.   Eyes:      Conjunctiva/sclera: Conjunctivae normal.   Cardiovascular:      Rate and Rhythm: Normal rate and regular rhythm.   Pulmonary:      Effort: Pulmonary effort is normal.      Breath sounds: Normal breath sounds.   Musculoskeletal:      Right lower leg: No edema.      Left lower leg: No edema.   Neurological:      General: No focal deficit present.      Mental Status: He is alert.   Psychiatric:         Mood and Affect: Mood normal.         Assessment/Plan     Essential hypertension  Continue losartan 50 mg daily  Continue healthy lifestyle with regular exercise and low-salt/Mediterranean type diet  Monitor and bring in home blood pressure readings     Mild aortic regurgitation  Dyslipidemia  Ascending aorta dilation  Lipid panel reviewed and values are improved on treatment  Continue atorvastatin 40 mg daily  Cardiac MRI is scheduled per [facility] cardiology, Dr. Leonard    Obesity   Continue Zepbound 2.5 mg weekly, uptitrate dose monthly as tolerated.  Upcoming endocrinology consult with Dr. Salinas scheduled on 7/3 and nutritionist on 7/8  Continue well-balanced diet and exercise routine     Mild persistent asthma  Allergic rhinitis  Continue Symbicort and albuterol inhaler as needed  Continue Claritin 10 mg daily and Flonase 2 sprays nightly  Appointment with pulmonary medicine, Dr. Garcia, scheduled on 6/4     Obstructive sleep apnea  Continue use of mouth appliance for now  Schedule appointment with  sleep  medicine     Vitamin D deficiency (vitamin D at goal at 57)  Continue vitamin D 5000 IU daily     History of colon polyps  Screening colonoscopy is scheduled on 5/29     History of skin cancer/melanoma  Dermatology appointment scheduled today     History of prostate cancer (PSA undetectable)  Continue annual follow-up with Dr. De La Rosa, next due in 4/2025    Health maintenance  Continue regular exercise  Hepatitis C screening lab work ordered to complete with next set of labs    Follow-up  1.  Office visit in September  (Lab work ordered to complete 3 to 5 days prior)  2.  Wellness exam/physical on/after 1/16/2025    Hang Dugan MD

## 2024-05-17 ENCOUNTER — OFFICE VISIT (OUTPATIENT)
Dept: PRIMARY CARE | Facility: CLINIC | Age: 69
End: 2024-05-17
Payer: COMMERCIAL

## 2024-05-17 ENCOUNTER — OFFICE VISIT (OUTPATIENT)
Dept: DERMATOLOGY | Facility: CLINIC | Age: 69
End: 2024-05-17
Payer: COMMERCIAL

## 2024-05-17 VITALS
BODY MASS INDEX: 32.28 KG/M2 | HEART RATE: 94 BPM | DIASTOLIC BLOOD PRESSURE: 70 MMHG | WEIGHT: 225 LBS | OXYGEN SATURATION: 95 % | SYSTOLIC BLOOD PRESSURE: 128 MMHG

## 2024-05-17 DIAGNOSIS — J45.30 MILD PERSISTENT ASTHMA WITHOUT COMPLICATION (HHS-HCC): ICD-10-CM

## 2024-05-17 DIAGNOSIS — L57.8 ACTINIC SKIN DAMAGE: ICD-10-CM

## 2024-05-17 DIAGNOSIS — D48.5 NEOPLASM OF UNCERTAIN BEHAVIOR OF SKIN: ICD-10-CM

## 2024-05-17 DIAGNOSIS — Z85.820 PERSONAL HISTORY OF MALIGNANT MELANOMA OF SKIN: ICD-10-CM

## 2024-05-17 DIAGNOSIS — Z11.59 NEED FOR HEPATITIS C SCREENING TEST: ICD-10-CM

## 2024-05-17 DIAGNOSIS — E66.09 CLASS 1 OBESITY DUE TO EXCESS CALORIES WITHOUT SERIOUS COMORBIDITY WITH BODY MASS INDEX (BMI) OF 32.0 TO 32.9 IN ADULT: ICD-10-CM

## 2024-05-17 DIAGNOSIS — D12.6 TUBULAR ADENOMA OF COLON: ICD-10-CM

## 2024-05-17 DIAGNOSIS — L81.4 LENTIGO: ICD-10-CM

## 2024-05-17 DIAGNOSIS — Z85.828 PERSONAL HISTORY OF SKIN CANCER: ICD-10-CM

## 2024-05-17 DIAGNOSIS — I77.810 ASCENDING AORTA DILATATION (CMS-HCC): ICD-10-CM

## 2024-05-17 DIAGNOSIS — Z85.46 HISTORY OF PROSTATE CANCER: ICD-10-CM

## 2024-05-17 DIAGNOSIS — I10 ESSENTIAL HYPERTENSION: ICD-10-CM

## 2024-05-17 DIAGNOSIS — E78.5 DYSLIPIDEMIA: Primary | ICD-10-CM

## 2024-05-17 DIAGNOSIS — L82.1 SEBORRHEIC KERATOSIS: ICD-10-CM

## 2024-05-17 DIAGNOSIS — D22.9 MULTIPLE BENIGN NEVI: Primary | ICD-10-CM

## 2024-05-17 DIAGNOSIS — Z12.83 SCREENING EXAM FOR SKIN CANCER: ICD-10-CM

## 2024-05-17 DIAGNOSIS — E55.9 VITAMIN D DEFICIENCY: ICD-10-CM

## 2024-05-17 DIAGNOSIS — J30.9 ALLERGIC RHINITIS, UNSPECIFIED SEASONALITY, UNSPECIFIED TRIGGER: ICD-10-CM

## 2024-05-17 DIAGNOSIS — Z86.018 HISTORY OF DYSPLASTIC NEVUS: ICD-10-CM

## 2024-05-17 DIAGNOSIS — G47.33 OSA (OBSTRUCTIVE SLEEP APNEA): ICD-10-CM

## 2024-05-17 PROBLEM — D22.5 MELANOCYTIC NEVI OF TRUNK: Status: RESOLVED | Noted: 2023-04-11 | Resolved: 2024-05-17

## 2024-05-17 PROBLEM — L73.8 OTHER SPECIFIED FOLLICULAR DISORDERS: Status: RESOLVED | Noted: 2023-04-11 | Resolved: 2024-05-17

## 2024-05-17 PROCEDURE — 11103 TANGNTL BX SKIN EA SEP/ADDL: CPT | Performed by: DERMATOLOGY

## 2024-05-17 PROCEDURE — 99213 OFFICE O/P EST LOW 20 MIN: CPT | Performed by: DERMATOLOGY

## 2024-05-17 PROCEDURE — 3008F BODY MASS INDEX DOCD: CPT | Performed by: INTERNAL MEDICINE

## 2024-05-17 PROCEDURE — 1159F MED LIST DOCD IN RCRD: CPT | Performed by: INTERNAL MEDICINE

## 2024-05-17 PROCEDURE — 3078F DIAST BP <80 MM HG: CPT | Performed by: INTERNAL MEDICINE

## 2024-05-17 PROCEDURE — 3074F SYST BP LT 130 MM HG: CPT | Performed by: INTERNAL MEDICINE

## 2024-05-17 PROCEDURE — 99214 OFFICE O/P EST MOD 30 MIN: CPT | Performed by: INTERNAL MEDICINE

## 2024-05-17 PROCEDURE — 1159F MED LIST DOCD IN RCRD: CPT | Performed by: DERMATOLOGY

## 2024-05-17 PROCEDURE — 1160F RVW MEDS BY RX/DR IN RCRD: CPT | Performed by: DERMATOLOGY

## 2024-05-17 PROCEDURE — 1160F RVW MEDS BY RX/DR IN RCRD: CPT | Performed by: INTERNAL MEDICINE

## 2024-05-17 PROCEDURE — 3008F BODY MASS INDEX DOCD: CPT | Performed by: DERMATOLOGY

## 2024-05-17 PROCEDURE — 1036F TOBACCO NON-USER: CPT | Performed by: DERMATOLOGY

## 2024-05-17 PROCEDURE — 11102 TANGNTL BX SKIN SINGLE LES: CPT | Performed by: DERMATOLOGY

## 2024-05-17 RX ORDER — ACETAMINOPHEN 500 MG
5000 TABLET ORAL DAILY
COMMUNITY

## 2024-05-17 RX ORDER — ATORVASTATIN CALCIUM 40 MG/1
40 TABLET, FILM COATED ORAL DAILY
COMMUNITY

## 2024-05-17 RX ORDER — ACETAMINOPHEN 500 MG
1000 TABLET ORAL DAILY
COMMUNITY

## 2024-05-17 ASSESSMENT — DERMATOLOGY QUALITY OF LIFE (QOL) ASSESSMENT
RATE HOW BOTHERED YOU ARE BY SYMPTOMS OF YOUR SKIN PROBLEM (EG, ITCHING, STINGING BURNING, HURTING OR SKIN IRRITATION): 0 - NEVER BOTHERED
DATE THE QUALITY-OF-LIFE ASSESSMENT WAS COMPLETED: 66977
RATE HOW EMOTIONALLY BOTHERED YOU ARE BY YOUR SKIN PROBLEM (FOR EXAMPLE, WORRY, EMBARRASSMENT, FRUSTRATION): 0 - NEVER BOTHERED
ARE THERE EXCLUSIONS OR EXCEPTIONS FOR THE QUALITY OF LIFE ASSESSMENT: NO
WHAT SINGLE SKIN CONDITION LISTED BELOW IS THE PATIENT ANSWERING THE QUALITY-OF-LIFE ASSESSMENT QUESTIONS ABOUT: NONE OF THE ABOVE
RATE HOW BOTHERED YOU ARE BY EFFECTS OF YOUR SKIN PROBLEMS ON YOUR ACTIVITIES (EG, GOING OUT, ACCOMPLISHING WHAT YOU WANT, WORK ACTIVITIES OR YOUR RELATIONSHIPS WITH OTHERS): 0 - NEVER BOTHERED

## 2024-05-17 ASSESSMENT — DERMATOLOGY PATIENT ASSESSMENT
DO YOU USE A TANNING BED: NO
DO YOU USE SUNSCREEN: OCCASIONALLY
DO YOU HAVE ANY NEW OR CHANGING LESIONS: NO
HAVE YOU HAD OR DO YOU HAVE VASCULAR DISEASE: NO
HAVE YOU HAD OR DO YOU HAVE A STAPH INFECTION: NO
ARE YOU AN ORGAN TRANSPLANT RECIPIENT: NO

## 2024-05-17 ASSESSMENT — PATIENT GLOBAL ASSESSMENT (PGA): PATIENT GLOBAL ASSESSMENT: PATIENT GLOBAL ASSESSMENT:  2 - MILD

## 2024-05-17 ASSESSMENT — ITCH NUMERIC RATING SCALE: HOW SEVERE IS YOUR ITCHING?: 0

## 2024-05-17 NOTE — PATIENT INSTRUCTIONS
Essential hypertension  Continue losartan 50 mg daily  Continue healthy lifestyle with regular exercise and low-salt/Mediterranean type diet  Monitor and bring in home blood pressure readings     Mild aortic regurgitation  Dyslipidemia  Ascending aorta dilation  Lipid panel reviewed and values are improved on treatment  Continue atorvastatin 40 mg daily  Cardiac MRI is scheduled per [facility]ology, Dr. Leonard    Obesity   Continue Zepbound 2.5 mg weekly, uptitrate dose monthly as tolerated.  Upcoming endocrinology consult with Dr. Salinas scheduled on 7/3 and nutritionist on 7/8  Continue well-balanced diet and exercise routine     Mild persistent asthma  Allergic rhinitis  Continue Symbicort and albuterol inhaler as needed  Continue Claritin 10 mg daily and Flonase 2 sprays nightly  Appointment with pulmonary medicine, Dr. Garcia, scheduled on 6/4     Obstructive sleep apnea  Continue use of mouth appliance for now  Schedule appointment with  sleep medicine     Vitamin D deficiency (vitamin D at goal at 57)  Continue vitamin D 5000 IU daily     History of colon polyps  Screening colonoscopy is scheduled on 5/29     History of skin cancer/melanoma  Dermatology appointment scheduled today     History of prostate cancer (PSA undetectable)  Continue annual follow-up with Dr. De La Rosa, next due in 4/2025    Health maintenance  Continue regular exercise  Hepatitis C screening lab work ordered to complete with next set of labs    Follow-up  1.  Office visit in September  (Lab work ordered to complete 3 to 5 days prior)  2.  Wellness exam/physical on/after 1/16/2025

## 2024-05-17 NOTE — PROGRESS NOTES
Subjective     Zak Brizuela is a 68 y.o. male who presents for the following: Skin Check (FBSE, area above right eye gets irritated and area to left lower eye lid needs checked).     Last derm visit 9/2023 for Full Skin Exam - actinic keratosis on right temple treated with liquid nitrogen.     Nevus noted previously on umbilicus and photographed. Biopsied by Dr. Gunderson with umbilical hernia procedure. Path read ATYPICAL COMPOUND NEVUS WITH MILD AND FOCAL MODERATE MELANOCYTIC DYSPLASIA, FOCALLY PRESENT AT AN INKED TISSUE EDGE, SEE COMMENT     COMMENT:  Some of the melanocytic dysplasia is likely site-related (special site nevus).  Clinical follow-up of this site with reexcision of any remaining or recurrent lesion is recommended.    Review of Systems:  No other skin or systemic complaints other than what is documented elsewhere in the note.    The following portions of the chart were reviewed this encounter and updated as appropriate:  Tobacco  Allergies  Meds  Problems  Med Hx  Surg Hx         Skin Cancer History  No skin cancer on file.      Specialty Problems          Dermatology Problems    BCC (basal cell carcinoma of skin)     Lesion 1: Basal Cell Carcinoma. Location: Lower Back. Year Diagnosed: 2012.     Lesion 2: Basal Cell Carcinoma. Location: Right Forearm Year Diagnosed: 2012.     Lesion 3: Basal Cell Carcinoma. Year Diagnosed: 2012. March. Location: Right shoulder     Lesion 4: Basal Cell Carcinoma. Year Diagnosed: 2012. March. Location: Anterior Chest.     Lesion 5: Basal Cell Carcinoma. Year Diagnosed: 2017. February. Location: Left Posterior Neck. Treatment(s): Wide Local Excision.     Lesion 6: Basal Cell Carcinoma. Deep margins involved. Year Diagnosed: 2020. June. Location: Left (Fossa) [place] Treatment(s): Mohs surgery Pathology: [id number]     Lesion 7: Superficial Basal Cell Carcinoma. Deep margins involved. Year Diagnosed: 2022. October. Location: Right Dorsal Forearm. Treatment(s): pending  Pathology: [id number]          Melanoma (Multi)     Melanoma 1: Year Diagnosed: 2012. March. Type: Location: Upper Back. Clinical Stage: David's Level: III Type: Malignant melanoma Breslow's Thickness: 0.55 mm Clinical Stage: IA     Melanoma 2: Year Diagnosed: 2015. October. Location: Right Abdomen. Treatment(s): WLE w/ 1 cm margins Breslow's Thickness: 0.33 mm Ulceration: absent          SCCA (squamous cell carcinoma) of skin     Lesion 1: SCC Location: Right Nasal sidewall. Year Diagnosed: 2012.     Lesion 2: Squamous Cell Carcinoma. in situ Year Diagnosed: 2023. January. Location: Right Dorsal Forearm. Treatment(s): Excision. [facility] Pathology: [id number]         Acne vulgaris    Erythema intertrigo    Rosacea, unspecified    Sebaceous cyst    Seborrheic dermatitis, unspecified    Tinea corporis    Tinea pedis    Tinea unguium    History of dysplastic nevus     Lesion 1: Severely Atypical Junctional Melanocytic Proliferation Year Diagnosed: 2021. February. Location: Right Upper Arm. Treatment(s): Excision 4/19/2021 Dr. Sahu Pathology: [id number]     Lesion 2: Compound Dysplastic Nevus with moderate to focal severe atypia Year Diagnosed: 2021. February. Location: Left Lateral Lower Leg. Treatment(s): Shave excision 5/3/2021 Dr. Sahu Pathology: [id number]     Lesion 3:  Umbilicus - biopsy 12/2023 - ATYPICAL COMPOUND NEVUS WITH MILD AND FOCAL MODERATE MELANOCYTIC DYSPLASIA, FOCALLY PRESENT AT AN INKED TISSUE EDGE, SEE COMMENT     COMMENT:  Some of the melanocytic dysplasia is likely site-related (special site nevus).  Clinical follow-up of this site with reexcision of any remaining or recurrent lesion is recommended.    5/17/24 - No recurrence of nevus biopsied/excised in umbilicus             Objective   Well appearing patient in no apparent distress; mood and affect are within normal limits.    A full examination was performed including scalp, head, eyes, ears, nose, lips, neck, chest, axillae, abdomen, back,  buttocks, bilateral upper extremities, bilateral lower extremities, hands, feet, fingers, toes, fingernails, and toenails. All findings within normal limits unless otherwise noted below.    Assessment/Plan   1. Multiple benign nevi  Brown and tan macules and papules with reassuring findings on dermoscopy    No recurrence of nevus biopsied/excised in umbilicus    -These lesions have benign, reassuring patterns on dermoscopy  -Recommend continued self observation, and to contact the office if any changes in nevi are noticed    2. Lentigo  Tan macules    -Benign appearing on exam  -Reassurance, recommend observation    3. Seborrheic keratosis  Stuck on, waxy macule(s)/papule(s)/plaque(s) with comedo-like openings and milia like cysts    -Discussed the nature of the diagnosis  -Reassurance, recommend continued observation    4. Actinic skin damage  Background of photodamage with hyper- and hypo-pigmented macules on the skin    5. History of dysplastic nevus    6. Personal history of malignant melanoma of skin  Lymph node basins palpated in relevant regions without appreciable adenopathy; regions include the neck and/or supraclavicular and/or axillary and/or inguinal and/or popliteal skin.    Personal History of Malignant Melanoma  -Well healed scar(s) with no evidence of recurrence  -Discussed the need for regular full skin examinations in the office, and monthly self examinations at home  -Discussed the need for annual ophthalmology exams with dilation  -Discussed concerning lesions and when to return sooner if any changes noted in skin lesions. Patient verbalizes understanding.    7. Personal history of skin cancer    Personal History of Non-Melanoma Skin Cancer  -Well healed scar(s) with no evidence of recurrence  -Discussed the need for annual or semi-annual skin examinations and to return sooner if any new or changing lesions are noticed. Patient verbalizes understanding    - discussed niacinamide for prophy    8.  Screening exam for skin cancer  As part of a routine Full Skin Exam, a genital examination with the presence of a chaperone was offered. The patient declined the exam and declined the chaperone.       Full body skin exam  -No lesions concerning for malignancy on the remainder the skin exam today   - The ugly duckling sign was discussed. Monitor for any skin lesions that are different in color, shape, or size than others on body  -Sun protection was discussed. Recommend SPF 30+, hats with brims, sun protective clothing, and avoiding sun exposure between 10 AM and 2 PM whenever possible  -Recommend regular skin exams or sooner if new or changing lesions       Related Procedures  Follow Up In Dermatology - Established Patient    9. Neoplasm of uncertain behavior of skin (2)  Right Forehead superior  2 mm pink papule with arborizing telangiectasia          Lesion biopsy  Type of biopsy: tangential    Informed consent: discussed and consent obtained    Timeout: patient name, date of birth, surgical site, and procedure verified    Procedure prep:  Patient was prepped and draped  Anesthesia: the lesion was anesthetized in a standard fashion    Anesthetic:  1% lidocaine w/ epinephrine 1-100,000 local infiltration  Instrument used: DermaBlade    Hemostasis achieved with: aluminum chloride    Outcome: patient tolerated procedure well    Post-procedure details: sterile dressing applied and wound care instructions given    Dressing type: petrolatum and bandage      Staff Communication: Dermatology Local Anesthesia: Site Location: right forehead superior and inferior 0.5 % Lidocaine / Epinephrine 1:200,000 - Amount: up to 2 ml    Specimen 1 - Dermatopathology- DERM LAB  Differential Diagnosis: basal cell carcinoma   Check Margins Yes/No?:    Comments:    Dermpath Lab: Routine Histopathology (formalin-fixed tissue)    Right Forehead inferior  3 mm pink papule with arborizing telangiectasia          Lesion biopsy  Type of biopsy:  tangential    Informed consent: discussed and consent obtained    Timeout: patient name, date of birth, surgical site, and procedure verified    Procedure prep:  Patient was prepped and draped  Anesthesia: the lesion was anesthetized in a standard fashion    Anesthetic:  1% lidocaine w/ epinephrine 1-100,000 local infiltration  Instrument used: DermaBlade    Hemostasis achieved with: aluminum chloride    Outcome: patient tolerated procedure well    Post-procedure details: sterile dressing applied and wound care instructions given    Dressing type: petrolatum and bandage      Staff Communication: Dermatology Local Anesthesia: Site Location: right forehead superior and inferior 0.5 % Lidocaine / Epinephrine 1:200,000 - Amount: up to 2 ml    Specimen 2 - Dermatopathology- DERM LAB  Differential Diagnosis: basal cell carcinoma   Check Margins Yes/No?:    Comments:    Dermpath Lab: Routine Histopathology (formalin-fixed tissue)          Follow up 6 months Full Skin Exam

## 2024-05-17 NOTE — PATIENT INSTRUCTIONS
Niacinamide / Nicotinamide     These are two names for the exact same thing. This is a type of vitamin B3, that when taken at very high doses has been shown to reduce the risk of nonmelanoma skin cancers and pre-cancers (basal cell carcinoma, squamous cell carcinoma, and actinic keratoses) by 20-30% after 1 year. The doses needed are higher than what is in multivitamins or vitamin B complex suppplements. It is safe to take it in addition to your multivitamin and/or vitamin B complex. Taking these high doses is safe as long as you have normal kidney function, since they are water soluble.       The dose: 500mg twice per day         Where to find:    - [employer] ONLINE     https://www.[employer].com/ip/Now-Foods-Niacinamide-Vitamin-B3-500-mg-100-Capsules-Pack-of-2/185662969?[email or web address]&[email or web address]=[id number]=&wl1=g&wl2=c&qw3=[id number]&[id number]=[id number]&[id number]&wl6=&wl7=&wl8=&wl9=jade&oo76=[id number]=envprt&lp48=[id number]&veh=mike&gad_source=1&bbhno=CjwKCAiAhJWsBhAaEiwAmrN[id number]_BwE       - [employer] (in store or online)    https://www.vitaminshoppe.com/p/niacinamide-500-mg-300-capsules/vs-1533       - NOW online    https://www.[employer].pocketfungames/products/supplements/niacinamide-b-3-500-mg-veg-capsules       - [employer] in store - but you will have to ask them to special order it for you. Some of my patients have done this successfully and been very happy getting it this way.

## 2024-05-18 ASSESSMENT — ENCOUNTER SYMPTOMS
DIARRHEA: 0
SHORTNESS OF BREATH: 0
HEADACHES: 0
FATIGUE: 0
PALPITATIONS: 0
DIZZINESS: 0
RHINORRHEA: 0
COUGH: 0
CONSTIPATION: 0

## 2024-05-21 ENCOUNTER — PATIENT MESSAGE (OUTPATIENT)
Dept: DERMATOLOGY | Facility: CLINIC | Age: 69
End: 2024-05-21
Payer: COMMERCIAL

## 2024-05-21 LAB
LABORATORY COMMENT REPORT: NORMAL
PATH REPORT.FINAL DX SPEC: NORMAL
PATH REPORT.GROSS SPEC: NORMAL
PATH REPORT.MICROSCOPIC SPEC OTHER STN: NORMAL
PATH REPORT.RELEVANT HX SPEC: NORMAL
PATH REPORT.TOTAL CANCER: NORMAL

## 2024-05-22 DIAGNOSIS — C44.310 BASAL CELL CARCINOMA (BCC) OF SKIN OF FACE, UNSPECIFIED PART OF FACE: Primary | ICD-10-CM

## 2024-05-23 PROCEDURE — RXMED WILLOW AMBULATORY MEDICATION CHARGE

## 2024-05-24 ENCOUNTER — PHARMACY VISIT (OUTPATIENT)
Dept: PHARMACY | Facility: CLINIC | Age: 69
End: 2024-05-24
Payer: COMMERCIAL

## 2024-05-29 ENCOUNTER — HOSPITAL ENCOUNTER (OUTPATIENT)
Dept: GASTROENTEROLOGY | Facility: HOSPITAL | Age: 69
Setting detail: OUTPATIENT SURGERY
Discharge: HOME | End: 2024-05-29
Payer: COMMERCIAL

## 2024-05-29 VITALS
TEMPERATURE: 97 F | OXYGEN SATURATION: 96 % | SYSTOLIC BLOOD PRESSURE: 111 MMHG | BODY MASS INDEX: 31.78 KG/M2 | HEIGHT: 70 IN | DIASTOLIC BLOOD PRESSURE: 69 MMHG | HEART RATE: 83 BPM | RESPIRATION RATE: 13 BRPM | WEIGHT: 222 LBS

## 2024-05-29 DIAGNOSIS — Z12.11 COLON CANCER SCREENING: ICD-10-CM

## 2024-05-29 DIAGNOSIS — D12.6 TUBULAR ADENOMA OF COLON: Primary | ICD-10-CM

## 2024-05-29 PROCEDURE — 7100000010 HC PHASE TWO TIME - EACH INCREMENTAL 1 MINUTE

## 2024-05-29 PROCEDURE — 45380 COLONOSCOPY AND BIOPSY: CPT | Performed by: STUDENT IN AN ORGANIZED HEALTH CARE EDUCATION/TRAINING PROGRAM

## 2024-05-29 PROCEDURE — 3700000012 HC SEDATION LEVEL 5+ TIME - INITIAL 15 MINUTES 5/> YEARS

## 2024-05-29 PROCEDURE — 99152 MOD SED SAME PHYS/QHP 5/>YRS: CPT | Performed by: STUDENT IN AN ORGANIZED HEALTH CARE EDUCATION/TRAINING PROGRAM

## 2024-05-29 PROCEDURE — 7100000009 HC PHASE TWO TIME - INITIAL BASE CHARGE

## 2024-05-29 PROCEDURE — 0753T DGTZ GLS MCRSCP SLD LEVEL IV: CPT | Mod: TC,SUR | Performed by: STUDENT IN AN ORGANIZED HEALTH CARE EDUCATION/TRAINING PROGRAM

## 2024-05-29 PROCEDURE — 45385 COLONOSCOPY W/LESION REMOVAL: CPT | Performed by: STUDENT IN AN ORGANIZED HEALTH CARE EDUCATION/TRAINING PROGRAM

## 2024-05-29 PROCEDURE — 2500000004 HC RX 250 GENERAL PHARMACY W/ HCPCS (ALT 636 FOR OP/ED): Performed by: STUDENT IN AN ORGANIZED HEALTH CARE EDUCATION/TRAINING PROGRAM

## 2024-05-29 RX ORDER — FENTANYL CITRATE 50 UG/ML
INJECTION, SOLUTION INTRAMUSCULAR; INTRAVENOUS AS NEEDED
Status: COMPLETED | OUTPATIENT
Start: 2024-05-29 | End: 2024-05-29

## 2024-05-29 RX ORDER — SODIUM CHLORIDE, SODIUM LACTATE, POTASSIUM CHLORIDE, CALCIUM CHLORIDE 600; 310; 30; 20 MG/100ML; MG/100ML; MG/100ML; MG/100ML
20 INJECTION, SOLUTION INTRAVENOUS CONTINUOUS
Status: DISCONTINUED | OUTPATIENT
Start: 2024-05-29 | End: 2024-05-30 | Stop reason: HOSPADM

## 2024-05-29 RX ORDER — MIDAZOLAM HYDROCHLORIDE 1 MG/ML
INJECTION, SOLUTION INTRAMUSCULAR; INTRAVENOUS AS NEEDED
Status: COMPLETED | OUTPATIENT
Start: 2024-05-29 | End: 2024-05-29

## 2024-05-29 RX ADMIN — FENTANYL CITRATE 25 MCG: 50 INJECTION, SOLUTION INTRAMUSCULAR; INTRAVENOUS at 08:05

## 2024-05-29 RX ADMIN — MIDAZOLAM HYDROCHLORIDE 1 MG: 1 INJECTION, SOLUTION INTRAMUSCULAR; INTRAVENOUS at 08:06

## 2024-05-29 RX ADMIN — FENTANYL CITRATE 50 MCG: 50 INJECTION, SOLUTION INTRAMUSCULAR; INTRAVENOUS at 08:00

## 2024-05-29 RX ADMIN — MIDAZOLAM HYDROCHLORIDE 1 MG: 1 INJECTION, SOLUTION INTRAMUSCULAR; INTRAVENOUS at 08:03

## 2024-05-29 RX ADMIN — FENTANYL CITRATE 25 MCG: 50 INJECTION, SOLUTION INTRAMUSCULAR; INTRAVENOUS at 08:03

## 2024-05-29 RX ADMIN — MIDAZOLAM HYDROCHLORIDE 1 MG: 1 INJECTION, SOLUTION INTRAMUSCULAR; INTRAVENOUS at 08:09

## 2024-05-29 RX ADMIN — MIDAZOLAM HYDROCHLORIDE 2 MG: 1 INJECTION, SOLUTION INTRAMUSCULAR; INTRAVENOUS at 08:00

## 2024-05-29 ASSESSMENT — PAIN - FUNCTIONAL ASSESSMENT
PAIN_FUNCTIONAL_ASSESSMENT: 0-10

## 2024-05-29 ASSESSMENT — PAIN SCALES - GENERAL
PAINLEVEL_OUTOF10: 0 - NO PAIN
PAINLEVEL_OUTOF10: 0 - NO PAIN
PAINLEVEL_OUTOF10: 3
PAINLEVEL_OUTOF10: 3
PAINLEVEL_OUTOF10: 0 - NO PAIN

## 2024-05-29 ASSESSMENT — COLUMBIA-SUICIDE SEVERITY RATING SCALE - C-SSRS
2. HAVE YOU ACTUALLY HAD ANY THOUGHTS OF KILLING YOURSELF?: NO
6. HAVE YOU EVER DONE ANYTHING, STARTED TO DO ANYTHING, OR PREPARED TO DO ANYTHING TO END YOUR LIFE?: NO
1. IN THE PAST MONTH, HAVE YOU WISHED YOU WERE DEAD OR WISHED YOU COULD GO TO SLEEP AND NOT WAKE UP?: NO

## 2024-05-29 NOTE — H&P
"History Of Present Illness  Zak Brizuela is a 68 y.o. male presenting for colonoscopy for colon polyp surveillance.   Colonoscopy in  with tubular adenomas (10-12mm), family hx of CRC (father).     Past Medical History  Past Medical History:   Diagnosis Date    Aortic root dilation (CMS-HCC)     Aortic valve regurgitation     Asthma (HHS-HCC)     Delayed emergence from general anesthesia     Hyperlipidemia     Hypertension     Malignant neoplasm of prostate (Multi) 2015    Prostate cancer    Meralgia paresthetica, left lower limb     Meralgia paresthetica of left side    Personal history of (healed) traumatic fracture     History of fracture of ankle    Personal history of malignant melanoma of skin     History of malignant melanoma    Personal history of other diseases of the respiratory system 2014    History of acute bronchitis    Personal history of other diseases of urinary system     History of urethral stricture    Personal history of other malignant neoplasm of skin     History of basal cell carcinoma    Sleep apnea      Surgical History  Past Surgical History:   Procedure Laterality Date    CATARACT EXTRACTION Right 2014    Cataract Extraction    HERNIA REPAIR Right     Inguinal Hernia Repair    KNEE ARTHROSCOPY W/ DEBRIDEMENT Right 2016    Arthroscopy Knee Right, for \"microfracture\" surgery    UMBILICAL HERNIA REPAIR  2023    VENTRAL HERNIA REPAIR  2023     Social History  He reports that he has never smoked. He has never been exposed to tobacco smoke. He has never used smokeless tobacco. He reports current alcohol use of about 14.0 standard drinks of alcohol per week. He reports that he does not use drugs.    Family History  Family History   Problem Relation Name Age of Onset    Arthritis Mother      Coronary artery disease Mother          Stents and ddied 1 week later at 84.  Smoker    Pancreatic cancer Father           at 84    Arthritis Father      Colon " cancer Father      Melanoma Father      Obesity Brother Min     Pancreatic cancer Paternal Grandfather          Allergies  No Known Allergies  Review of Systems  Constitutional/ General: No fever   Eyes: no yellow discoloration  ENT: normal   Cardiovascular: no chest pain, no palpitations  Respiratory: no shortness of breath  Gastrointestinal: denies abdominal pain   Integumentary: no yellow discoloration of skin  Neurologic: no weakness or numbness of extremities  Genitourinary: no hematuria    All other systems have been reviewed and are negative except as noted in the HPI and above.    Pre-sedation Evaluation:  ASA Classification - ASA 3 - Patient with moderate systemic disease with functional limitations  Mallampati Score - II (hard and soft palate, upper portion of tonsils and uvula visible)    Physical Exam  Constitutional: awake, alert, in no acute distress  Eyes: EOMI   ENT: no oropharyngeal lesions visualized  Respiratory: Bilateral air entry equal   Cardiovascular: regular rate and rhythm, no lower extremity edema  Abdomen: soft, non tender, non distended   Integumentary: no wounds on examined skin   Musculoskeletal: no joint swelling   Neurologic: gross motor strength intact   Psychiatric: alert, appropriate mood and affect, oriented to time/place/person    Last Recorded Vitals  There were no vitals taken for this visit.     Assessment/Plan   Plan for colonoscopy today. Will review procedure details, potential risks and obtain informed consent.        PTA/Current Medications:  (Not in a hospital admission)    Current Outpatient Medications   Medication Sig Dispense Refill    albuterol (ProAir HFA) 90 mcg/actuation inhaler Inhale 2 puffs.   EVERY 4-6 HOURS AS NEEDED FOR WHEEZING      atorvastatin (Lipitor) 40 mg tablet Take 1 tablet (40 mg) by mouth once daily.      cholecalciferol (Vitamin D3) 5,000 Units tablet Take 1 tablet (5,000 Units) by mouth once daily.      fluticasone (Flonase) 50 mcg/actuation  nasal spray Administer 2 sprays into each nostril once daily at bedtime. Shake gently. Before first use, prime pump. After use, clean tip and replace cap. 16 g 11    glucosamine/chondro blood A/C/Mn (GLUCOSAMINE-CHONDROITIN COMPLX ORAL) Take by mouth. 1500/1200 mg daily      loratadine (CLARITIN ORAL) Take 5 mg by mouth once daily.      losartan (Cozaar) 50 mg tablet Take 1 tablet (50 mg) by mouth once daily.      omega-3 (Fish OiL) 300-1,000 mg capsule Take 1 capsule (1,000 mg) by mouth once daily.      Symbicort 160-4.5 mcg/actuation inhaler Inhale 2 puffs 2 times a day.      tirzepatide, weight loss, (Zepbound) 5 mg/0.5 mL injection Inject 5 mg under the skin every 7 days. 2 mL 0    ketoconazole (NIZOral) 2 % cream 1 Application.      metroNIDAZOLE (Metrocream) 0.75 % cream 1 Application.      terbinafine (LamISIL) 250 mg tablet 1 tablet (250 mg).      valACYclovir (Valtrex) 500 mg tablet Take 1 tablet (500 mg) by mouth 3 times a day.  FOR 3 DAYS AS NEEDED FOR COLD SORES       Current Facility-Administered Medications   Medication Dose Route Frequency Provider Last Rate Last Admin    lactated Ringer's infusion  20 mL/hr intravenous Continuous MD Doris Lauren MD

## 2024-06-04 ENCOUNTER — APPOINTMENT (OUTPATIENT)
Dept: PULMONOLOGY | Facility: CLINIC | Age: 69
End: 2024-06-04
Payer: COMMERCIAL

## 2024-06-04 LAB
LABORATORY COMMENT REPORT: NORMAL
PATH REPORT.FINAL DX SPEC: NORMAL
PATH REPORT.GROSS SPEC: NORMAL
PATH REPORT.TOTAL CANCER: NORMAL

## 2024-07-02 PROCEDURE — RXMED WILLOW AMBULATORY MEDICATION CHARGE

## 2024-07-03 ENCOUNTER — APPOINTMENT (OUTPATIENT)
Dept: ENDOCRINOLOGY | Facility: CLINIC | Age: 69
End: 2024-07-03
Payer: COMMERCIAL

## 2024-07-03 ENCOUNTER — PHARMACY VISIT (OUTPATIENT)
Dept: PHARMACY | Facility: CLINIC | Age: 69
End: 2024-07-03
Payer: COMMERCIAL

## 2024-07-03 VITALS
BODY MASS INDEX: 31.64 KG/M2 | DIASTOLIC BLOOD PRESSURE: 62 MMHG | WEIGHT: 221 LBS | SYSTOLIC BLOOD PRESSURE: 128 MMHG | HEIGHT: 70 IN

## 2024-07-03 DIAGNOSIS — G47.33 OSA (OBSTRUCTIVE SLEEP APNEA): ICD-10-CM

## 2024-07-03 DIAGNOSIS — E88.810 DYSMETABOLIC SYNDROME: Primary | ICD-10-CM

## 2024-07-03 DIAGNOSIS — E78.5 DYSLIPIDEMIA: ICD-10-CM

## 2024-07-03 DIAGNOSIS — I10 ESSENTIAL HYPERTENSION: ICD-10-CM

## 2024-07-03 PROCEDURE — 3074F SYST BP LT 130 MM HG: CPT | Performed by: INTERNAL MEDICINE

## 2024-07-03 PROCEDURE — 3008F BODY MASS INDEX DOCD: CPT | Performed by: INTERNAL MEDICINE

## 2024-07-03 PROCEDURE — 99205 OFFICE O/P NEW HI 60 MIN: CPT | Performed by: INTERNAL MEDICINE

## 2024-07-03 PROCEDURE — 3078F DIAST BP <80 MM HG: CPT | Performed by: INTERNAL MEDICINE

## 2024-07-03 PROCEDURE — 1159F MED LIST DOCD IN RCRD: CPT | Performed by: INTERNAL MEDICINE

## 2024-07-03 RX ORDER — POTASSIUM CHLORIDE 750 MG/1
10 TABLET, FILM COATED, EXTENDED RELEASE ORAL 2 TIMES DAILY
Qty: 60 TABLET | Refills: 5 | Status: SHIPPED | OUTPATIENT
Start: 2024-07-03 | End: 2025-07-03

## 2024-07-03 NOTE — PROGRESS NOTES
Patient ID: Zak Brizuela Jr. is a 68 y.o. male who presents for New Patient Visit (Endocrine consult. Referred by Dr. Dugan for weight loss.).  HPI  The patient is referred for evaluation for PSMF.    This is a 68-year-old gentleman who has had a problem related for the past 10 to 12 years.    He has been on numerous approaches with limited minimal long-term success.    More recently he has been on Zepbound currently 5 mg as he cannot get a higher dose and has been able to lose about 9 pounds over about a 2-month period.    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    He avoid skipping meals and avoid stress eating.    He is exercising regularly.    He has a past history of aortic valve regurgitation he has never had gout kidney stones or gallstones.    Socially he is  just retired non-smoker drinks alcohol socially.    Family history negative for diabetes or thyroid.    ROS  Comprehensive review of systems is negative.    Objective   Physical Exam  Visit Vitals  /62      Vitals:    07/03/24 1145   Weight: 100 kg (221 lb)      Body mass index is 31.71 kg/m².      Alert and oriented x3  In no distress  No focal neurologic deficits  No supraclavicular, or dorsal fat  No purple striae  Integument intact  Eyes normal  ENT normal. No adenopathy  Thyroid palpable and normal. No nodules  Chest clear to auscultation  Heart sounds are normal  Abdomen nontender. Bowel sounds normal. No organomegaly  Feet are okay  Reflexes normal with normal return  Current Outpatient Medications   Medication Sig Dispense Refill    albuterol (ProAir HFA) 90 mcg/actuation inhaler Inhale 2 puffs.   EVERY 4-6 HOURS AS NEEDED FOR WHEEZING      atorvastatin (Lipitor) 40 mg tablet Take 1 tablet (40 mg) by mouth once daily.      cholecalciferol (Vitamin D3) 5,000 Units tablet Take 1 tablet (5,000 Units) by mouth once daily.      fluticasone (Flonase) 50  mcg/actuation nasal spray Administer 2 sprays into each nostril once daily at bedtime. Shake gently. Before first use, prime pump. After use, clean tip and replace cap. 16 g 11    glucosamine/chondro blood A/C/Mn (GLUCOSAMINE-CHONDROITIN COMPLX ORAL) Take by mouth. 1500/1200 mg daily      ketoconazole (NIZOral) 2 % cream 1 Application.      loratadine (CLARITIN ORAL) Take 5 mg by mouth once daily.      losartan (Cozaar) 50 mg tablet Take 1 tablet (50 mg) by mouth once daily.      metroNIDAZOLE (Metrocream) 0.75 % cream 1 Application.      omega-3 (Fish OiL) 300-1,000 mg capsule Take 1 capsule (1,000 mg) by mouth once daily.      Symbicort 160-4.5 mcg/actuation inhaler Inhale 2 puffs 2 times a day.      terbinafine (LamISIL) 250 mg tablet 1 tablet (250 mg).      tirzepatide, weight loss, (Zepbound) 5 mg/0.5 mL injection Inject 5 mg under the skin every 7 days. 4 each 0    valACYclovir (Valtrex) 500 mg tablet Take 1 tablet (500 mg) by mouth 3 times a day.  FOR 3 DAYS AS NEEDED FOR COLD SORES       No current facility-administered medications for this visit.       Assessment/Plan     1.  Likely insulin resistance  2.  Hypertension  3.  Hyperlipidemia  4.  Sleep apnea    We discussed the likelihood of insulin resistance this pathophysiology and its impact.    We discussed risks, benefits and alternatives to the protein sparing modified fast.    We discussed risks, including, but not limited to the potential for electrolyte imbalance which could lead to cardiac arrhythmia.  The high protein nature of the diet increasing levels of uric acid which could lead to nephrolithiasis or gout.  The low fiber nature of the diet increasing risk for constipation.  Risk for gallstones, cold intolerance, excess skin seen with significant weight loss.      We discussed the benefits of weight loss regarding co-morbid conditions.      We also discussed alternatives to the program, including a balanced calorie restricted approach coupled with  exercise and he would like to proceed.  We'll therefore set him up with the nutritionist for dietary instruction. When he starts the program, we'll add in potassium, as well as the other supplements and keep a close eye on electrolytes.    He'll follow up with me in 1 month, sooner as needed.    I spent 60 minutes with this patient.  Greater than 50% of this time was spent in counseling and/or coordination of care.

## 2024-07-08 ENCOUNTER — APPOINTMENT (OUTPATIENT)
Dept: ENDOCRINOLOGY | Facility: CLINIC | Age: 69
End: 2024-07-08
Payer: COMMERCIAL

## 2024-07-25 ENCOUNTER — TELEPHONE (OUTPATIENT)
Dept: ORTHOPEDIC SURGERY | Facility: CLINIC | Age: 69
End: 2024-07-25
Payer: MEDICARE

## 2024-08-02 ENCOUNTER — OFFICE VISIT (OUTPATIENT)
Dept: ORTHOPEDIC SURGERY | Facility: CLINIC | Age: 69
End: 2024-08-02
Payer: MEDICARE

## 2024-08-02 ENCOUNTER — HOSPITAL ENCOUNTER (OUTPATIENT)
Dept: RADIOLOGY | Facility: CLINIC | Age: 69
Discharge: HOME | End: 2024-08-02
Payer: MEDICARE

## 2024-08-02 VITALS — WEIGHT: 215 LBS | BODY MASS INDEX: 30.78 KG/M2 | HEIGHT: 70 IN

## 2024-08-02 DIAGNOSIS — M77.02 GOLFER'S ELBOW, LEFT: Primary | ICD-10-CM

## 2024-08-02 DIAGNOSIS — M25.522 LEFT ELBOW PAIN: ICD-10-CM

## 2024-08-02 PROCEDURE — 99214 OFFICE O/P EST MOD 30 MIN: CPT | Performed by: ORTHOPAEDIC SURGERY

## 2024-08-02 PROCEDURE — 3008F BODY MASS INDEX DOCD: CPT | Performed by: ORTHOPAEDIC SURGERY

## 2024-08-02 PROCEDURE — 73080 X-RAY EXAM OF ELBOW: CPT | Mod: LT

## 2024-08-02 PROCEDURE — 1159F MED LIST DOCD IN RCRD: CPT | Performed by: ORTHOPAEDIC SURGERY

## 2024-08-02 PROCEDURE — 20605 DRAIN/INJ JOINT/BURSA W/O US: CPT | Performed by: ORTHOPAEDIC SURGERY

## 2024-08-02 NOTE — LETTER
August 8, 2024     Hang Dugan MD  [address]  [address]  [facility] [address]    Patient: Zak Brizuela Jr.   YOB: 1955   Date of Visit: 8/2/2024       Dear Dr. Hang Dugan MD:    Thank you for referring Zak Brizuela to me for evaluation. Below are my notes for this consultation.  If you have questions, please do not hesitate to call me. I look forward to following your patient along with you.       Sincerely,     Faustino Upton MD      CC: No Recipients  ______________________________________________________________________________________    Left elbow pain racquet sports left-hand-dominant hurts with activities no numbness or tingling  Past medical,family and social histories have been reviewed and are up to date.  All other body systems have been reviewed and are negative for complaint.  Constitutional: Well-developed well-nourished   Eyes: Sclerae anicteric, pupils equal and round  HENT: Normocephalic atraumatic  Cardiovascular: Pulses full, regular rate and rhythm  Respiratory: Breathing not labored, no wheezing  Integumentary: Skin intact, no lesions or rashes  Neurological: Sensation intact, no gross strength deficits, reflexes equal  Psychiatric: Alert oriented and appropriate  Hematologic/lymphatic: No lymphadenopathy  Left elbow, no mass swelling erythema full range of motion tender over the medial condyle worse with resisted pronation and wrist flexion  X-rays negative            Medial epicondylitis injected stretches rest  M Inj/Asp: L elbow on 8/8/2024 11:05 AM  Indications: pain  Details: 25 G needle, medial approach  Medications: 20 mg methylPREDNISolone acetate 40 mg/mL; 2 mL lidocaine 20 mg/mL (2 %)

## 2024-08-02 NOTE — PROGRESS NOTES
Left elbow pain racquet sports left-hand-dominant hurts with activities no numbness or tingling  Past medical,family and social histories have been reviewed and are up to date.  All other body systems have been reviewed and are negative for complaint.  Constitutional: Well-developed well-nourished   Eyes: Sclerae anicteric, pupils equal and round  HENT: Normocephalic atraumatic  Cardiovascular: Pulses full, regular rate and rhythm  Respiratory: Breathing not labored, no wheezing  Integumentary: Skin intact, no lesions or rashes  Neurological: Sensation intact, no gross strength deficits, reflexes equal  Psychiatric: Alert oriented and appropriate  Hematologic/lymphatic: No lymphadenopathy  Left elbow, no mass swelling erythema full range of motion tender over the medial condyle worse with resisted pronation and wrist flexion  X-rays negative            Medial epicondylitis injected stretches rest  M Inj/Asp: L elbow on 8/8/2024 11:05 AM  Indications: pain  Details: 25 G needle, medial approach  Medications: 20 mg methylPREDNISolone acetate 40 mg/mL; 2 mL lidocaine 20 mg/mL (2 %)

## 2024-08-05 ENCOUNTER — APPOINTMENT (OUTPATIENT)
Dept: DERMATOLOGY | Facility: CLINIC | Age: 69
End: 2024-08-05
Payer: MEDICARE

## 2024-08-05 DIAGNOSIS — C44.310 BASAL CELL CARCINOMA (BCC) OF SKIN OF FACE, UNSPECIFIED PART OF FACE: ICD-10-CM

## 2024-08-05 PROCEDURE — 17311 MOHS 1 STAGE H/N/HF/G: CPT | Performed by: DERMATOLOGY

## 2024-08-05 PROCEDURE — 13131 CMPLX RPR F/C/C/M/N/AX/G/H/F: CPT | Performed by: DERMATOLOGY

## 2024-08-05 PROCEDURE — 99214 OFFICE O/P EST MOD 30 MIN: CPT | Performed by: DERMATOLOGY

## 2024-08-05 PROCEDURE — 17312 MOHS ADDL STAGE: CPT | Performed by: DERMATOLOGY

## 2024-08-05 NOTE — PROGRESS NOTES
Office Visit Note  Date: 8/5/2024  Surgeon:  Desire Tucker MD  Office Location: DO [address] [facility]  [address]  [address]  [facility] [address]  Dept: [phone]  Dept Fax: [phone]  Referring Provider: Nina Slater MD  [address]  [facility] [address]  [place] [address]    Subjective   Zak Brizuela Jr. is a 68 y.o. male who presents for the following: MOHS Surgery    According to the patient, the lesion has been present for approximately 6 months at the time of diagnosis.  The lesion is itchy.  The lesion has not been treated previously.    The patient does not have a pacemaker / defibrillator.  The patient does not have a heart valve / joint replacement.    The patient is not on blood thinners.  The patient does not have a history of hepatitis B or C.  The patient does not have a history of HIV.  The patient does have a history of immunosuppression (e.g. organ transplantation, malignancy, medications)    Review of Systems:  No other skin or systemic complaints other than what is documented elsewhere in the note.    MEDICAL HISTORY: clinically relevant history including significant past medical history, medications and allergies was reviewed and documented in Epic.    Objective   Well appearing patient in no apparent distress; mood and affect are within normal limits.  Vital signs: See record.  Noted on the Right Forehead Inferior  Is a 0.9 x 0.7 cm scar    The patient confirmed the identified site.    Discussion:  The nature of the diagnosis was explained. The lesion is a skin cancer.  It has a risk of local growth and distant spread. The condition is associated with sun exposure.  Warning signs of non-melanoma skin cancer discussed. Patient was instructed to perform monthly self skin examination.  We recommended that the patient have regular full skin exams given an increased risk of subsequent skin cancers. The patient was instructed to use sun  protective behaviors including use of broad spectrum sunscreens and sun protective clothing to reduce risk of skin cancers.      Risks, benefits, side effects of Mohs surgery were discussed with patient and the patient voiced understanding.  It was explained that even though the cure rate of Mohs is very high it is not 100%. Risks of surgery including but not limited to bleeding, infection, numbness, nerve damage, and scar were reviewed.  Discussion included wound care requirements, activity restrictions, likely scar outcome and time to heal.     After Mohs surgery, the defect may need to be repaired surgically and the scar may be longer than the original lesion.  Reconstruction options, risks, and benefits were reviewed including second intention healing, linear repair (4-1 ratio was explained), local flaps, skin grafts, cartilage grafts and interpolation flaps (the need for multiple surgeries was explained). Possible outcomes were reviewed including likely scar appearance, failure of flap survival, infection, bleeding and the need for revision surgery.     The pathology was reviewed, the photograph was reviewed, and the referring physician's note was reviewed.    Patient elected for Mohs surgery.

## 2024-08-05 NOTE — PROGRESS NOTES
Mohs Surgery Operative Note    Date of Surgery:  8/5/2024  Surgeon:  Desire Tucker MD  Office Location:  [address] [facility]  [address] [facility]  [address]  [facility] [address]  Dept: [phone]  Dept Fax: [phone]  Referring Provider: Nina Slater MD  [address]   [address]  [place] [address]      Assessment/Plan   Pre-procedure:   Obtained informed consent: written from patient  The surgical site was identified and confirmed with the patient.     Intra-operative:   Audible time out called at : 8:50 AM 08/05/24  by: Claire Lerner RN   Verified patient name, birthdate, site, specimen bottle label & requisition.    The planned procedure(s) was again reviewed with the patient. The risks of bleeding, infection, nerve damage and scarring were reviewed. Written authorization was obtained. The patient identity, surgical site, and planned procedure(s) were verified. The provider acted as both surgeon and pathologist.     Basal cell carcinoma (BCC) of skin of face, unspecified part of face  Right Forehead Inferior  Mohs surgery  Consent obtained: written    Universal Protocol:  Procedure explained and questions answered to patient or proxy's satisfaction: Yes    Test results available and properly labeled: Yes    Pathology report reviewed: Yes    External notes reviewed: Yes    Photo or diagram used for site identification: Yes    Site/side marked: Yes    Slide independently reviewed by Mohs surgeon: Yes    Immediately prior to procedure a time out was called: Yes    Patient identity confirmed: verbally with patient  Preparation: Patient was prepped and draped in usual sterile fashion      Anticoagulation:  Is the patient taking prescription anticoagulant and/or aspirin prescribed/recommended by a physician? No    Was the anticoagulation regimen changed prior to Mohs? No      Anesthesia:  Anesthesia method: local infiltration  Local anesthetic: lidocaine 2%  WITH epi    Procedure Details:  Biopsy accession number: [id number]  Date of biopsy: 5/17/2024  Pre-Op diagnosis: basal cell carcinoma  BCC subtype: nodular  Surgery side: right  Surgical site (from skin exam): Right Forehead Inferior  Pre-operative length (cm): 0.9  Pre-operative width (cm): 0.7  Indications for Mohs surgery: anatomic location where tissue conservation is critical  Previously treated? No      Micrographic Surgery Details:  Post-operative length (cm): 1  Post-operative width (cm): 0.9  Number of Mohs stages: 2    Stage 1  Comments: The patient was brought into the operating room and placed in the procedure chair in the appropriate position.  The area positive by previous biopsy was identified and confirmed with the patient. The area of clinically obvious tumor was debulked using a curette and/or scalpel as needed. An incision was made following the Mohs approach through the skin. The specimen was taken to the lab, divided into 2 piece(s) and appropriately chromacoded and processed.  Tumor was seen on both the lateral and deep margins as indicated on the on the Mohs map.  Nodular basal cell carcinoma. Histologic examination revealed large tumor aggregates of atypical basaloid cells with peripheral palisading and tumoral clefting.   Tumor features identified on Mohs section: basal carcinoma   Depth of invasion: dermis    Stage 2  Comments: The area of positivity as noted on the Mohs map in the previous stage was identified and removed using the Mohs technique. The specimen was taken to the lab and appropriately chromacoded and processed in 1 piece(s).  Tumor features identified on Mohs section: no tumor identified  Depth of defect: subcutaneous fat    Patient tolerance of procedure: tolerated well, no immediate complications    Reconstruction:  Was the defect reconstructed? Yes    Was reconstruction performed by the same Mohs surgeon? Yes    Setting of reconstruction: outpatient office  When was  reconstruction performed? same day  Type of reconstruction: linear  Linear reconstruction: complex  Length of linear repair (cm): 1.7  Subcutaneous Layers (Deep Stitches)   Suture size:  5-0  Suture type:  Vicryl  Stitches:  Buried vertical mattress  Fine/surface layer approximation (top stitches)   Epidermal/Superficial suture size:  5-0  Epidermal/Superficial suture type:  Fast-absorbing gut  Stitches: simple running    Hemostasis achieved with: electrodesiccation  Outcome: patient tolerated procedure well with no complications    Post-procedure details: sterile dressing applied and wound care instructions given    Dressing type: pressure dressing, petrolatum, Hypafix and Telfa pad      Complex Linear Repair - Wide Undermining:  Given the location and size of the defect, it was determined that a complex layered linear closure was required to restore normal anatomy and function. The repair was considered complex because extensive undermining was required and performed. The amount of undermining performed was greater than the maximum width of the defect as measured perpendicular to the closure line along at least one entire edge of the defect. Standing cutaneous cones were removed using Burow's triangles. The wound edges were brought into close approximation with buried vertical mattress sutures. The remainder of the wound was then closed with epidermal top sutures.    The final repair measured 1.7 cm              Wound care was discussed, and the patient was given written post-operative wound care instructions.      The patient will follow up with Desire Tucker MD as needed for any post operative problems or concerns, and will follow up with their primary dermatologist as scheduled.

## 2024-08-08 RX ORDER — LIDOCAINE HYDROCHLORIDE 20 MG/ML
2 INJECTION, SOLUTION INFILTRATION; PERINEURAL
Status: COMPLETED | OUTPATIENT
Start: 2024-08-08 | End: 2024-08-08

## 2024-08-08 RX ORDER — METHYLPREDNISOLONE ACETATE 40 MG/ML
20 INJECTION, SUSPENSION INTRA-ARTICULAR; INTRALESIONAL; INTRAMUSCULAR; SOFT TISSUE
Status: COMPLETED | OUTPATIENT
Start: 2024-08-08 | End: 2024-08-08

## 2024-08-12 ENCOUNTER — APPOINTMENT (OUTPATIENT)
Dept: DERMATOLOGY | Facility: CLINIC | Age: 69
End: 2024-08-12
Payer: MEDICARE

## 2024-08-12 VITALS — DIASTOLIC BLOOD PRESSURE: 74 MMHG | SYSTOLIC BLOOD PRESSURE: 132 MMHG | HEART RATE: 81 BPM

## 2024-08-12 DIAGNOSIS — C44.310 BASAL CELL CARCINOMA (BCC) OF SKIN OF FACE, UNSPECIFIED PART OF FACE: ICD-10-CM

## 2024-08-12 PROCEDURE — 13131 CMPLX RPR F/C/C/M/N/AX/G/H/F: CPT | Performed by: DERMATOLOGY

## 2024-08-12 PROCEDURE — 17311 MOHS 1 STAGE H/N/HF/G: CPT | Performed by: DERMATOLOGY

## 2024-08-12 NOTE — PROGRESS NOTES
Mohs Surgery Operative Note    Date of Surgery:  8/12/2024  Surgeon:  Desire Tucker MD  Office Location:  [address] [facility]  [address] [facility]  [address]  [facility] [address]  Dept: [phone]  Dept Fax: [phone]  Referring Provider: Nina Slater MD  [address]   [facility]  [place] [address]      Assessment/Plan   Pre-procedure:   Obtained informed consent: written from patient  The surgical site was identified and confirmed with the patient.     Intra-operative:   Audible time out called at : 8:27AM 08/12/24  by: Yesi Friedman RN   Verified patient name, birthdate, site, specimen bottle label & requisition.    The planned procedure(s) was again reviewed with the patient. The risks of bleeding, infection, nerve damage and scarring were reviewed. Written authorization was obtained. The patient identity, surgical site, and planned procedure(s) were verified. The provider acted as both surgeon and pathologist.     Basal cell carcinoma (BCC) of skin of face, unspecified part of face  Right Forehead Superior  Mohs surgery  Consent obtained: written    Universal Protocol:  Procedure explained and questions answered to patient or proxy's satisfaction: Yes    Test results available and properly labeled: Yes    Pathology report reviewed: Yes    External notes reviewed: Yes    Photo or diagram used for site identification: Yes    Site/side marked: Yes    Slide independently reviewed by Mohs surgeon: Yes    Immediately prior to procedure a time out was called: Yes    Patient identity confirmed: verbally with patient  Preparation: Patient was prepped and draped in usual sterile fashion      Anticoagulation:  Is the patient taking prescription anticoagulant and/or aspirin prescribed/recommended by a physician? No    Was the anticoagulation regimen changed prior to Mohs? No      Anesthesia:  Anesthesia method: local infiltration  Local anesthetic: lidocaine 2%  WITH epi    Procedure Details:  Biopsy accession number: [id number]  Date of biopsy: 5/17/2024  Pre-Op diagnosis: basal cell carcinoma  BCC subtype: nodular  Surgery side: right  Surgical site (from skin exam): Right Forehead Superior  Pre-operative length (cm): 0.5  Pre-operative width (cm): 0.7  Indications for Mohs surgery: anatomic location where tissue conservation is critical  Previously treated? No      Micrographic Surgery Details:  Post-operative length (cm): 0.6  Post-operative width (cm): 0.9  Number of Mohs stages: 1    Stage 1     Comments: The patient was brought into the operating room and placed in the procedure chair in the appropriate position.  The area positive by previous biopsy was identified and confirmed with the patient. The area of clinically obvious tumor was debulked using a curette and/or scalpel as needed. An incision was made following the Mohs approach through the skin. The specimen was taken to the lab, divided into 2 piece(s) and appropriately chromacoded and processed.  Tumor features identified on Mohs section: no tumor identified  Depth of defect: subcutaneous fat    Patient tolerance of procedure: tolerated well, no immediate complications    Reconstruction:  Was the defect reconstructed? Yes    Was reconstruction performed by the same Mohs surgeon? Yes    Setting of reconstruction: outpatient office  When was reconstruction performed? same day  Type of reconstruction: linear  Linear reconstruction: complex  Length of linear repair (cm): 1.5  Subcutaneous Layers (Deep Stitches)   Suture size:  5-0  Suture type:  Vicryl  Stitches:  Buried vertical mattress  Fine/surface layer approximation (top stitches)   Epidermal/Superficial suture size:  5-0  Epidermal/Superficial suture type:  Fast-absorbing gut  Stitches: simple running    Hemostasis achieved with: electrodesiccation  Outcome: patient tolerated procedure well with no complications    Post-procedure details: sterile dressing  applied and wound care instructions given    Dressing type: Telfa pad, pressure dressing and Hypafix      Complex Linear Repair - Wide Undermining:  Given the location and size of the defect, it was determined that a complex layered linear closure was required to restore normal anatomy and function. The repair was considered complex because extensive undermining was required and performed. The amount of undermining performed was greater than the maximum width of the defect as measured perpendicular to the closure line along at least one entire edge of the defect. Standing cutaneous cones were removed using Burow's triangles. The wound edges were brought into close approximation with buried vertical mattress sutures. The remainder of the wound was then closed with epidermal top sutures.    The final repair measured 1.5 cm                Wound care was discussed, and the patient was given written post-operative wound care instructions.      The patient will follow up with Desire Tucker MD as needed for any post operative problems or concerns, and will follow up with their primary dermatologist as scheduled.

## 2024-08-22 ENCOUNTER — APPOINTMENT (OUTPATIENT)
Dept: DERMATOLOGY | Facility: CLINIC | Age: 69
End: 2024-08-22
Payer: COMMERCIAL

## 2024-09-17 ENCOUNTER — LAB (OUTPATIENT)
Dept: LAB | Facility: LAB | Age: 69
End: 2024-09-17
Payer: MEDICARE

## 2024-09-17 DIAGNOSIS — I10 ESSENTIAL HYPERTENSION: ICD-10-CM

## 2024-09-17 DIAGNOSIS — E55.9 VITAMIN D DEFICIENCY: ICD-10-CM

## 2024-09-17 DIAGNOSIS — Z11.59 NEED FOR HEPATITIS C SCREENING TEST: ICD-10-CM

## 2024-09-17 LAB
25(OH)D3 SERPL-MCNC: 27 NG/ML (ref 30–100)
ANION GAP SERPL CALC-SCNC: 13 MMOL/L (ref 10–20)
BUN SERPL-MCNC: 15 MG/DL (ref 6–23)
CALCIUM SERPL-MCNC: 8.8 MG/DL (ref 8.6–10.3)
CHLORIDE SERPL-SCNC: 103 MMOL/L (ref 98–107)
CO2 SERPL-SCNC: 26 MMOL/L (ref 21–32)
CREAT SERPL-MCNC: 0.85 MG/DL (ref 0.5–1.3)
EGFRCR SERPLBLD CKD-EPI 2021: >90 ML/MIN/1.73M*2
GLUCOSE SERPL-MCNC: 86 MG/DL (ref 74–99)
HCV AB SER QL: NONREACTIVE
POTASSIUM SERPL-SCNC: 4.1 MMOL/L (ref 3.5–5.3)
SODIUM SERPL-SCNC: 138 MMOL/L (ref 136–145)

## 2024-09-17 PROCEDURE — 36415 COLL VENOUS BLD VENIPUNCTURE: CPT

## 2024-09-19 ENCOUNTER — APPOINTMENT (OUTPATIENT)
Dept: PRIMARY CARE | Facility: CLINIC | Age: 69
End: 2024-09-19
Payer: COMMERCIAL

## 2024-09-19 VITALS
HEART RATE: 77 BPM | DIASTOLIC BLOOD PRESSURE: 74 MMHG | SYSTOLIC BLOOD PRESSURE: 122 MMHG | WEIGHT: 216 LBS | OXYGEN SATURATION: 97 % | BODY MASS INDEX: 30.99 KG/M2

## 2024-09-19 DIAGNOSIS — I35.1 MILD AORTIC REGURGITATION: ICD-10-CM

## 2024-09-19 DIAGNOSIS — Z00.00 WELLNESS EXAMINATION: Primary | ICD-10-CM

## 2024-09-19 DIAGNOSIS — Z85.46 HISTORY OF PROSTATE CANCER: ICD-10-CM

## 2024-09-19 DIAGNOSIS — J30.9 ALLERGIC RHINITIS, UNSPECIFIED SEASONALITY, UNSPECIFIED TRIGGER: ICD-10-CM

## 2024-09-19 DIAGNOSIS — C44.310 BASAL CELL CARCINOMA (BCC) OF SKIN OF FACE, UNSPECIFIED PART OF FACE: ICD-10-CM

## 2024-09-19 DIAGNOSIS — I10 ESSENTIAL HYPERTENSION: Primary | ICD-10-CM

## 2024-09-19 DIAGNOSIS — E66.09 CLASS 1 OBESITY DUE TO EXCESS CALORIES WITHOUT SERIOUS COMORBIDITY WITH BODY MASS INDEX (BMI) OF 32.0 TO 32.9 IN ADULT: ICD-10-CM

## 2024-09-19 DIAGNOSIS — J45.30 MILD PERSISTENT ASTHMA WITHOUT COMPLICATION (HHS-HCC): ICD-10-CM

## 2024-09-19 DIAGNOSIS — E55.9 VITAMIN D DEFICIENCY: ICD-10-CM

## 2024-09-19 DIAGNOSIS — D12.6 TUBULAR ADENOMA OF COLON: ICD-10-CM

## 2024-09-19 DIAGNOSIS — E78.5 DYSLIPIDEMIA: ICD-10-CM

## 2024-09-19 PROCEDURE — 1036F TOBACCO NON-USER: CPT | Performed by: INTERNAL MEDICINE

## 2024-09-19 PROCEDURE — 1159F MED LIST DOCD IN RCRD: CPT | Performed by: INTERNAL MEDICINE

## 2024-09-19 PROCEDURE — 3074F SYST BP LT 130 MM HG: CPT | Performed by: INTERNAL MEDICINE

## 2024-09-19 PROCEDURE — 3078F DIAST BP <80 MM HG: CPT | Performed by: INTERNAL MEDICINE

## 2024-09-19 PROCEDURE — 99214 OFFICE O/P EST MOD 30 MIN: CPT | Performed by: INTERNAL MEDICINE

## 2024-09-19 RX ORDER — ACETAMINOPHEN 500 MG
6000 TABLET ORAL DAILY
Start: 2024-09-19

## 2024-09-19 RX ORDER — FLUTICASONE PROPIONATE AND SALMETEROL 250; 50 UG/1; UG/1
1 POWDER RESPIRATORY (INHALATION)
Start: 2024-09-19 | End: 2025-09-19

## 2024-09-19 RX ORDER — FLUTICASONE PROPIONATE AND SALMETEROL 250; 50 UG/1; UG/1
1 POWDER RESPIRATORY (INHALATION) 2 TIMES DAILY
COMMUNITY
Start: 2024-09-17 | End: 2024-09-19 | Stop reason: ALTCHOICE

## 2024-09-19 RX ORDER — NIACINAMIDE 500 MG
500 TABLET ORAL
Start: 2024-09-19 | End: 2025-09-19

## 2024-09-19 ASSESSMENT — ENCOUNTER SYMPTOMS
CONSTIPATION: 0
PALPITATIONS: 0
DIZZINESS: 0
HEADACHES: 0
SHORTNESS OF BREATH: 0
RHINORRHEA: 0
FATIGUE: 0
WHEEZING: 0
COUGH: 0

## 2024-09-19 NOTE — PATIENT INSTRUCTIONS
Essential hypertension  Continue losartan 50 mg daily  Continue healthy lifestyle with regular exercise and low-salt/Mediterranean type diet  Monitor and bring in home blood pressure readings     Mild aortic regurgitation  Dyslipidemia  Ascending aorta dilation  Continue healthy diet and regular exercise routine  Continue atorvastatin 40 mg daily  Continue follow-up with [facility] cardiology, Dr. Leonard.  Next appointment due in 6/2025     Obesity (with 5 pound weight loss on semaglutide)  Plan is to switch from semaglutide to tirzepatide (per [facility] compounding pharmacy/clinic)  Continue healthy lifestyle with low-carb/sugar, high-fiber-protein diet and regular aerobic exercise (goal of 150 minutes/week) and weight-strengthening exercise and update office in 1-2 months regarding weight     Mild persistent asthma  Allergic rhinitis  Continue corticosteroid/bronchodilator maintenance inhaler (switching to Wixela per insurance formulary)  Follow-up with Dr. Muniz, [facility] pulmonary medicine as scheduled in 1/2025     Obstructive sleep apnea  Continue use of mouth appliance for now    Vitamin D deficiency (vitamin D low at 27)  Increase vitamin D from 4000 IU daily to 6000 IU daily     Tubular adenoma on recent colonoscopy  Next colonoscopy due in 5/2029     History of skin cancer/melanoma  Recent basal cell carcinoma x 2 on forehead  Continue routine follow-up with dermatology, next appointment in November  Continue niacinamide 500 mg twice a day      History of prostate cancer (PSA undetectable)  Continue annual follow-up with Dr. De La Rosa, next appointment in 5/2025    Health maintenance  Vaccinations up-to-date  Continued regular exercise recommended    Follow-up  Wellness exam/physical on 1/29/2025 (as scheduled)  (Fasting lab work will be ordered for patient to complete 3 to 5 days prior)

## 2024-09-19 NOTE — PROGRESS NOTES
Subjective   Patient ID: Zak Brizuela Jr. is a 68 y.o. male who presents for Follow-up.    Routine follow-up    Essential hypertension  Home blood pressure readings in the 120s/70s.  Blood pressure in office today at goal.  Patient remains on losartan 50 mg daily     Mild aortic regurgitation  Dyslipidemia  Ascending aorta dilation  Active lifestyle with regular walking.  No exertional chest pain, palpitations, lightheadedness.  Cardiac MRI without worrisome findings per [facility] cardiology.  Next appointment with Dr. Leonard will be due in 6/2025     Obesity  5 pound weight loss since starting semaglutide (through compounding pharmacy-[facility]) 3 months ago.  Patient will be switching to tirzepatide 5 mg weekly  He had a visit with endocrinology, Dr. Salinas, in July.  Plan is to switch from semaglutide to tirzepatide (per [facility] compounding pharmacy/clinic)     Mild persistent asthma  Allergic rhinitis  Well-controlled.  Patient states he has better breathing capacity, likely due to increased exercise.  No wheezing.  No need for rescue inhaler.  Switching to Wixela inhaler secondary to insurance coverage.  Follow-up with Dr. Muniz, [facility] pulmonary medicine scheduled in 1/2025     Obstructive sleep apnea  Using oral appliance    Vitamin D deficiency   Vitamin D is low at 27, on vitamin D 4000 IU daily.  Previously on 5000 IU daily.    Tubular adenoma on recent colonoscopy  Next colonoscopy due in 5/2029     History of skin cancer/melanoma  Recent basal cell carcinoma x 2 on forehead  Niacinamide 500 mg twice a day added by Dr. Slater for basal cell prevention.     History of prostate cancer (PSA undetectable)  Annual follow-up with Dr. De La Rosa, next appointment in 5/2025    Health maintenance  Patient recently received COVID-19, influenza, and RSV vaccines 1 week ago  He is exercising with 60-minute walk 3-4 times per week  Continued regular exercise recommended           Review of Systems    Constitutional:  Negative for fatigue.   HENT:  Negative for postnasal drip and rhinorrhea.    Respiratory:  Negative for cough, shortness of breath and wheezing.    Cardiovascular:  Negative for chest pain and palpitations.   Gastrointestinal:  Negative for constipation.   Neurological:  Negative for dizziness and headaches.       Objective   /74 (BP Location: Left arm, Patient Position: Sitting, BP Cuff Size: Adult)   Pulse 77   Wt 98 kg (216 lb)   SpO2 97%   BMI 30.99 kg/m²     Physical Exam  Vitals reviewed.   HENT:      Head: Normocephalic.      Right Ear: Tympanic membrane normal.      Left Ear: Tympanic membrane normal.      Mouth/Throat:      Mouth: Mucous membranes are moist.   Eyes:      Conjunctiva/sclera: Conjunctivae normal.   Cardiovascular:      Rate and Rhythm: Normal rate and regular rhythm.   Pulmonary:      Effort: Pulmonary effort is normal.      Breath sounds: Normal breath sounds. No wheezing.   Musculoskeletal:      Right lower leg: No edema.      Left lower leg: No edema.   Neurological:      Mental Status: He is alert.         Assessment/Plan     Essential hypertension  Continue losartan 50 mg daily  Continue healthy lifestyle with regular exercise and low-salt/Mediterranean type diet  Monitor and bring in home blood pressure readings     Mild aortic regurgitation  Dyslipidemia  Ascending aorta dilation  Continue healthy diet and regular exercise routine  Continue atorvastatin 40 mg daily  Continue follow-up with [facility] cardiology, Dr. Leonard.  Next appointment due in 6/2025     Obesity (with 5 pound weight loss on semaglutide)  Plan is to switch from semaglutide to tirzepatide (per [facility] compounding pharmacy/clinic)  Continue healthy lifestyle with low-carb/sugar, high-fiber-protein diet and regular aerobic exercise (goal of 150 minutes/week) and weight-strengthening exercise and update office in 1-2 months regarding weight     Mild persistent asthma  Allergic  rhinitis  Continue corticosteroid/bronchodilator maintenance inhaler (switching to Wixela per insurance formulary)  Follow-up with Dr. Muniz, [facility] pulmonary medicine as scheduled in 1/2025     Obstructive sleep apnea  Continue use of mouth appliance for now    Vitamin D deficiency (vitamin D low at 27)  Increase vitamin D from 4000 IU daily to 6000 IU daily     Tubular adenoma on recent colonoscopy  Next colonoscopy due in 5/2029     History of skin cancer/melanoma  Recent basal cell carcinoma x 2 on forehead  Continue routine follow-up with dermatology, next appointment in November  Continue niacinamide 500 mg twice a day      History of prostate cancer (PSA undetectable)  Continue annual follow-up with Dr. De La Rosa, next appointment in 5/2025    Health maintenance  Vaccinations up-to-date  Continued regular exercise recommended    Follow-up  Wellness exam/physical on 1/29/2025 (as scheduled)  (Fasting lab work will be ordered for patient to complete 3 to 5 days prior)    Hang Dugan MD

## 2024-10-07 ENCOUNTER — APPOINTMENT (OUTPATIENT)
Dept: ENDOCRINOLOGY | Facility: CLINIC | Age: 69
End: 2024-10-07
Payer: MEDICARE

## 2024-10-19 ENCOUNTER — PATIENT MESSAGE (OUTPATIENT)
Dept: DERMATOLOGY | Facility: CLINIC | Age: 69
End: 2024-10-19
Payer: MEDICARE

## 2024-11-11 ENCOUNTER — OFFICE VISIT (OUTPATIENT)
Dept: ORTHOPEDIC SURGERY | Facility: CLINIC | Age: 69
End: 2024-11-11
Payer: MEDICARE

## 2024-11-11 ENCOUNTER — HOSPITAL ENCOUNTER (OUTPATIENT)
Dept: RADIOLOGY | Facility: CLINIC | Age: 69
Discharge: HOME | End: 2024-11-11
Payer: MEDICARE

## 2024-11-11 DIAGNOSIS — M17.12 PRIMARY OSTEOARTHRITIS OF LEFT KNEE: Primary | ICD-10-CM

## 2024-11-11 DIAGNOSIS — M25.562 LEFT KNEE PAIN, UNSPECIFIED CHRONICITY: ICD-10-CM

## 2024-11-11 PROCEDURE — 73562 X-RAY EXAM OF KNEE 3: CPT | Mod: LT

## 2024-11-11 PROCEDURE — 20610 DRAIN/INJ JOINT/BURSA W/O US: CPT | Mod: LT | Performed by: ORTHOPAEDIC SURGERY

## 2024-11-11 PROCEDURE — 73562 X-RAY EXAM OF KNEE 3: CPT | Mod: LEFT SIDE | Performed by: RADIOLOGY

## 2024-11-11 PROCEDURE — 1160F RVW MEDS BY RX/DR IN RCRD: CPT | Performed by: ORTHOPAEDIC SURGERY

## 2024-11-11 PROCEDURE — 1036F TOBACCO NON-USER: CPT | Performed by: ORTHOPAEDIC SURGERY

## 2024-11-11 PROCEDURE — 1159F MED LIST DOCD IN RCRD: CPT | Performed by: ORTHOPAEDIC SURGERY

## 2024-11-11 PROCEDURE — 99214 OFFICE O/P EST MOD 30 MIN: CPT | Performed by: ORTHOPAEDIC SURGERY

## 2024-11-11 PROCEDURE — 99214 OFFICE O/P EST MOD 30 MIN: CPT | Mod: 25 | Performed by: ORTHOPAEDIC SURGERY

## 2024-11-11 PROCEDURE — 2500000004 HC RX 250 GENERAL PHARMACY W/ HCPCS (ALT 636 FOR OP/ED): Performed by: ORTHOPAEDIC SURGERY

## 2024-11-11 NOTE — PROGRESS NOTES
Chief Complaint   Chief Complaint   Patient presents with    Left Knee - Pain         HPI:      Zak Brizuela Jr. is a pleasant 68 y.o. year-old male who is seen today for left knee pain.  He is very active in sports enjoys paddle tennis his knee is not too bothersome with pelvis tenderness actually awakens him at nighttime no similar symptoms on the right side treated with arthroscopic microfracture  No locking or catching    Review of Systems all other body systems have been reviewed and are negative for complaint.    There were no vitals filed for this visit.    Past Medical History:   Diagnosis Date    Aortic root dilation (CMS-HCC)     Aortic valve regurgitation     Asthma     Delayed emergence from general anesthesia     Fracture of ankle 2005    Hyperlipidemia     Hypertension     Malignant neoplasm of prostate (Multi) 01/06/2015    Prostate cancer    Meralgia paresthetica, left lower limb     Meralgia paresthetica of left side    Personal history of (healed) traumatic fracture     History of fracture of ankle    Personal history of malignant melanoma of skin     History of malignant melanoma    Personal history of other diseases of the respiratory system 04/14/2014    History of acute bronchitis    Personal history of other diseases of urinary system     History of urethral stricture    Personal history of other malignant neoplasm of skin     History of basal cell carcinoma    Sleep apnea      Patient Active Problem List   Diagnosis    Acne vulgaris    Erythema intertrigo    Hypogonadism male    Internal derangement of knee joint, right    Rosacea, unspecified    Rotator cuff tear    Sebaceous cyst    Seborrheic dermatitis, unspecified    Tinea corporis    Tinea pedis    Tinea unguium    History of prostate cancer    Agatston coronary artery calcium score less than 100    Dyslipidemia    History of COVID-19    Left ventricular diastolic dysfunction    Melanoma (Multi)    Mild aortic regurgitation    Mild  persistent asthma without complication (HHS-HCC)    KEVIN (obstructive sleep apnea)    Tubular adenoma of colon    BCC (basal cell carcinoma of skin)    SCCA (squamous cell carcinoma) of skin    Ascending aorta dilatation (CMS-HCC)    Essential hypertension    Vitamin D deficiency    Allergic rhinitis    Class 1 obesity due to excess calories without serious comorbidity with body mass index (BMI) of 32.0 to 32.9 in adult    Family history of colon cancer in father    History of dysplastic nevus    Dysmetabolic syndrome       Medication Documentation Review Audit       Reviewed by Mynor Mirza MA (Medical Assistant) on 11/11/24 at 1355      Medication Order Taking? Sig Documenting Provider Last Dose Status   albuterol (ProAir HFA) 90 mcg/actuation inhaler [id number]  Inhale 2 puffs every 6 hours if needed for wheezing. Hang Dugan MD  Active   atorvastatin (Lipitor) 40 mg tablet [id number] No Take 1 tablet (40 mg) by mouth once daily. Historical Provider, MD Taking Active   cholecalciferol (Vitamin D3) 50 mcg (2,000 unit) capsule [id number]  Take 3 capsules (150 mcg) by mouth once daily. Hang Dugan MD  Active   fluticasone (Flonase) 50 mcg/actuation nasal spray [id number] No Administer 2 sprays into each nostril once daily at bedtime. Shake gently. Before first use, prime pump. After use, clean tip and replace cap. Hang Dugan MD Taking Active   fluticasone propion-salmeteroL (Wixela Inhub) 250-50 mcg/dose diskus inhaler [id number]  Inhale 1 puff 2 times a day. Rinse mouth with water after use to reduce aftertaste and incidence of candidiasis. Do not swallow. Hang Dugan MD  Active   glucosamine/chondro blood A/C/Mn (GLUCOSAMINE-CHONDROITIN COMPLX ORAL) [id number] No Take by mouth. 1500/1200 mg daily Historical Provider, MD Taking Active   ketoconazole (NIZOral) 2 % cream [id number] No 1 Application. Historical Provider, MD Taking Active   loratadine (CLARITIN ORAL) [id number] No Take 5 mg by mouth once  daily. Historical Provider, MD Taking Active   losartan (Cozaar) 50 mg tablet [id number] No Take 1 tablet (50 mg) by mouth once daily. Historical Provider, MD Taking Active   metroNIDAZOLE (Metrocream) 0.75 % cream [id number] No 1 Application. Historical Provider, MD Taking Active   niacinamide 500 mg tablet [id number]  Take 1 tablet (500 mg) by mouth 2 times daily (morning and late afternoon). Hang Dugan MD  Active   omega-3 (Fish OiL) 300-1,000 mg capsule [id number] No Take 1 capsule (1,000 mg) by mouth once daily. Historical MD Torin Taking Active   potassium chloride CR 10 mEq ER tablet [id number] No Take 1 tablet (10 mEq) by mouth 2 times a day. Do not crush, chew, or split. Thien Salinas MD Taking Active   SEMAGLUTIDE SUBQ [id number] No Inject 12.5 mg under the skin 1 (one) time per week. Historical MD Torin Taking Active   terbinafine (LamISIL) 250 mg tablet [id number] No 1 tablet (250 mg). Luba Harkins MD Not Taking Active   valACYclovir (Valtrex) 500 mg tablet [id number] No Take 1 tablet (500 mg) by mouth 3 times a day.  FOR 3 DAYS AS NEEDED FOR COLD SORES Historical Provider, MD Taking Active                    No Known Allergies    Social History     Socioeconomic History    Marital status:      Spouse name: Not on file    Number of children: Not on file    Years of education: Not on file    Highest education level: Not on file   Occupational History    Not on file   Tobacco Use    Smoking status: Never     Passive exposure: Never    Smokeless tobacco: Never   Vaping Use    Vaping status: Never Used   Substance and Sexual Activity    Alcohol use: Yes     Alcohol/week: 8.0 standard drinks of alcohol     Types: 8 Glasses of wine per week     Comment: 14 glasses wine per week    Drug use: Never    Sexual activity: Not Currently     Partners: Female   Other Topics Concern    Not on file   Social History Narrative    Not on file     Social Drivers of Health     Financial Resource  Strain: Low Risk  (5/13/2022)    Received from [facility]    Overall Financial Resource Strain (CARDIA)     Difficulty of Paying Living Expenses: Not hard at all   Food Insecurity: No Food Insecurity (3/12/2023)    Received from [facility]    Hunger Vital Sign     Worried About Running Out of Food in the Last Year: Never true     Ran Out of Food in the Last Year: Never true   Transportation Needs: No Transportation Needs (3/12/2023)    Received from [facility]    PRAPARE - Transportation     Lack of Transportation (Medical): No     Lack of Transportation (Non-Medical): No   Physical Activity: Sufficiently Active (3/12/2023)    Received from [facility]    Exercise Vital Sign     Days of Exercise per Week: 3 days     Minutes of Exercise per Session: 50 min   Stress: No Stress Concern Present (3/12/2023)    Received from [facility]    [place] [employer] - Occupational Stress Questionnaire     Feeling of Stress : Not at all   Social Connections: Moderately Integrated (3/12/2023)    Received from [facility]    Social Connection and Isolation Panel [NHANES]     Frequency of Communication with Friends and Family: Three times a week     Frequency of Social Gatherings with Friends and Family: Twice a week     Attends Confucianist Services: Never     Active Member of Clubs or Organizations: Not on file     Attends Club or Organization Meetings: More than 4 times per year     Marital Status:    Intimate Partner Violence: Not on file   Housing Stability: Low Risk  (3/12/2023)    Received from [facility]    Housing Stability Vital Sign     Unable to Pay for Housing in the Last Year: No     Number of Places Lived in the Last Year: 1     Unstable Housing in the Last Year: No       Past Surgical History:   Procedure Laterality Date     "CATARACT EXTRACTION Right 04/14/2014    Cataract Extraction    HERNIA REPAIR Right 2005    Inguinal Hernia Repair    KNEE ARTHROSCOPY W/ DEBRIDEMENT Right 05/29/2016    Arthroscopy Knee Right, for \"microfracture\" surgery    UMBILICAL HERNIA REPAIR  12/05/2023    VENTRAL HERNIA REPAIR  06/2023       There is no height or weight on file to calculate BMI.    HgA1c:   Lab Results   Component Value Date    HGBA1C 5.3 01/15/2024    ZMLHQRCM4P 105 01/15/2024       Physical Exam:  Constitutional: Well-developed well-nourished   Eyes: Sclerae anicteric, pupils equal and round  HENT: Normocephalic atraumatic  Cardiovascular: Pulses full, regular rate and rhythm  Respiratory: Breathing not labored, no wheezing  Integumentary: Skin intact, no lesions or rashes  Neurological: Sensation intact, no gross strength deficits, reflexes equal  Psychiatric: Alert oriented and appropriate  Hematologic/lymphatic: No lymphadenopathy  Left knee: No angular deformity no effusion tender With anterior compression full range of motion no gross instability          Imaging:  X-rays show some early degenerative change        Impression/Plan:  Patellofemoral arthritis plan activity modification injection today reassurance no further imaging at this time   L Inj/Asp: L knee on 11/13/2024 9:56 AM  Indications: pain  Details: 21 G needle, lateral approach  Medications: 40 mg methylPREDNISolone acetate 40 mg/mL; 2 mL lidocaine 20 mg/mL (2 %)            Early arthritic change injected knee today  "

## 2024-11-13 PROCEDURE — 20610 DRAIN/INJ JOINT/BURSA W/O US: CPT | Mod: LT | Performed by: ORTHOPAEDIC SURGERY

## 2024-11-13 RX ORDER — LIDOCAINE HYDROCHLORIDE 20 MG/ML
2 INJECTION, SOLUTION INFILTRATION; PERINEURAL
Status: COMPLETED | OUTPATIENT
Start: 2024-11-13 | End: 2024-11-13

## 2024-11-13 RX ORDER — METHYLPREDNISOLONE ACETATE 40 MG/ML
40 INJECTION, SUSPENSION INTRA-ARTICULAR; INTRALESIONAL; INTRAMUSCULAR; SOFT TISSUE
Status: COMPLETED | OUTPATIENT
Start: 2024-11-13 | End: 2024-11-13

## 2024-11-19 ENCOUNTER — APPOINTMENT (OUTPATIENT)
Dept: DERMATOLOGY | Facility: CLINIC | Age: 69
End: 2024-11-19
Payer: COMMERCIAL

## 2024-11-19 DIAGNOSIS — L23.7 ALLERGIC CONTACT DERMATITIS DUE TO PLANT: ICD-10-CM

## 2024-11-19 DIAGNOSIS — L57.0 ACTINIC KERATOSIS: ICD-10-CM

## 2024-11-19 DIAGNOSIS — Z12.83 SCREENING EXAM FOR SKIN CANCER: ICD-10-CM

## 2024-11-19 DIAGNOSIS — Z86.018 HISTORY OF DYSPLASTIC NEVUS: ICD-10-CM

## 2024-11-19 DIAGNOSIS — D22.9 MULTIPLE BENIGN NEVI: Primary | ICD-10-CM

## 2024-11-19 DIAGNOSIS — L82.1 SEBORRHEIC KERATOSIS: ICD-10-CM

## 2024-11-19 DIAGNOSIS — L81.4 LENTIGO: ICD-10-CM

## 2024-11-19 DIAGNOSIS — L57.8 ACTINIC SKIN DAMAGE: ICD-10-CM

## 2024-11-19 DIAGNOSIS — Z85.820 PERSONAL HISTORY OF MALIGNANT MELANOMA OF SKIN: ICD-10-CM

## 2024-11-19 DIAGNOSIS — Z85.828 PERSONAL HISTORY OF SKIN CANCER: ICD-10-CM

## 2024-11-19 PROCEDURE — 17000 DESTRUCT PREMALG LESION: CPT | Performed by: DERMATOLOGY

## 2024-11-19 PROCEDURE — 99213 OFFICE O/P EST LOW 20 MIN: CPT | Performed by: DERMATOLOGY

## 2024-11-19 PROCEDURE — 1160F RVW MEDS BY RX/DR IN RCRD: CPT | Performed by: DERMATOLOGY

## 2024-11-19 PROCEDURE — 1159F MED LIST DOCD IN RCRD: CPT | Performed by: DERMATOLOGY

## 2024-11-19 PROCEDURE — 17003 DESTRUCT PREMALG LES 2-14: CPT | Performed by: DERMATOLOGY

## 2024-11-19 PROCEDURE — 1036F TOBACCO NON-USER: CPT | Performed by: DERMATOLOGY

## 2024-11-19 RX ORDER — CLOBETASOL PROPIONATE 0.5 MG/G
OINTMENT TOPICAL 2 TIMES DAILY
Qty: 30 G | Refills: 1 | Status: SHIPPED | OUTPATIENT
Start: 2024-11-19

## 2024-11-19 ASSESSMENT — ITCH NUMERIC RATING SCALE: HOW SEVERE IS YOUR ITCHING?: 0

## 2024-11-19 ASSESSMENT — DERMATOLOGY QUALITY OF LIFE (QOL) ASSESSMENT
RATE HOW BOTHERED YOU ARE BY SYMPTOMS OF YOUR SKIN PROBLEM (EG, ITCHING, STINGING BURNING, HURTING OR SKIN IRRITATION): 0 - NEVER BOTHERED
RATE HOW BOTHERED YOU ARE BY EFFECTS OF YOUR SKIN PROBLEMS ON YOUR ACTIVITIES (EG, GOING OUT, ACCOMPLISHING WHAT YOU WANT, WORK ACTIVITIES OR YOUR RELATIONSHIPS WITH OTHERS): 0 - NEVER BOTHERED
RATE HOW EMOTIONALLY BOTHERED YOU ARE BY YOUR SKIN PROBLEM (FOR EXAMPLE, WORRY, EMBARRASSMENT, FRUSTRATION): 0 - NEVER BOTHERED
DATE THE QUALITY-OF-LIFE ASSESSMENT WAS COMPLETED: 67163
ARE THERE EXCLUSIONS OR EXCEPTIONS FOR THE QUALITY OF LIFE ASSESSMENT: NO

## 2024-11-19 ASSESSMENT — DERMATOLOGY PATIENT ASSESSMENT
DO YOU USE SUNSCREEN: OCCASIONALLY
ARE YOU AN ORGAN TRANSPLANT RECIPIENT: NO
DO YOU USE A TANNING BED: NO
DO YOU HAVE ANY NEW OR CHANGING LESIONS: NO
HAVE YOU HAD OR DO YOU HAVE A STAPH INFECTION: NO
HAVE YOU HAD OR DO YOU HAVE VASCULAR DISEASE: NO

## 2024-11-19 ASSESSMENT — PATIENT GLOBAL ASSESSMENT (PGA): PATIENT GLOBAL ASSESSMENT: PATIENT GLOBAL ASSESSMENT:  1 - CLEAR

## 2024-11-19 NOTE — PROGRESS NOTES
Subjective     Zak Brizuela Jr. is a 68 y.o. male who presents for the following: Skin Check (No areas of concern; personal hx of skin ca; skin ca on fathers side). Last derm visit 5/15/24 for Full Skin Exam. His melanomas were stage IA and in 2012 and 2015. His most recent nonmelanoma skin cancer was 5/2024    Review of Systems:  No other skin or systemic complaints other than what is documented elsewhere in the note.    The following portions of the chart were reviewed this encounter and updated as appropriate:  Tobacco  Allergies  Meds  Problems  Med Hx  Surg Hx         Skin Cancer History  Biopsy Date Type Location Status   5/17/24 BCC Right Forehead superior Patient/Caregiver Informed  8/12/24 5/17/24 BCC Right Forehead inferior Patient/Caregiver Informed  8/12/24       Specialty Problems          Dermatology Problems    BCC (basal cell carcinoma of skin)     Lesion 1: Basal Cell Carcinoma. Location: Lower Back. Year Diagnosed: 2012.     Lesion 2: Basal Cell Carcinoma. Location: Right Forearm Year Diagnosed: 2012.     Lesion 3: Basal Cell Carcinoma. Year Diagnosed: 2012. March. Location: Right shoulder     Lesion 4: Basal Cell Carcinoma. Year Diagnosed: 2012. March. Location: Anterior Chest.     Lesion 5: Basal Cell Carcinoma. Year Diagnosed: 2017. February. Location: Left Posterior Neck. Treatment(s): Wide Local Excision.     Lesion 6: Basal Cell Carcinoma. Deep margins involved. Year Diagnosed: 2020. June. Location: Left (Fossa) [place] Treatment(s): Mohs surgery Pathology: [id number]     Lesion 7: Superficial Basal Cell Carcinoma. Deep margins involved. Year Diagnosed: 2022. October. Location: Right Dorsal Forearm. Treatment(s): pending Pathology: [id number]          Melanoma (Multi)     Melanoma 1: Year Diagnosed: 2012. March. Type: Location: Upper Back. Clinical Stage: David's Level: III Type: Malignant melanoma Breslow's Thickness: 0.55 mm Clinical Stage: IA     Melanoma 2: Year Diagnosed: 2015.  October. Location: Right Abdomen. Treatment(s): WLE w/ 1 cm margins Breslow's Thickness: 0.33 mm Ulceration: absent          SCCA (squamous cell carcinoma) of skin     Lesion 1: SCC Location: Right Nasal sidewall. Year Diagnosed: 2012.     Lesion 2: Squamous Cell Carcinoma. in situ Year Diagnosed: 2023. January. Location: Right Dorsal Forearm. Treatment(s): Excision. [facility] Pathology: [id number]         Acne vulgaris    Erythema intertrigo    Rosacea, unspecified    Sebaceous cyst    Seborrheic dermatitis, unspecified    Tinea corporis    Tinea pedis    Tinea unguium    History of dysplastic nevus     Lesion 1: Severely Atypical Junctional Melanocytic Proliferation Year Diagnosed: 2021. February. Location: Right Upper Arm. Treatment(s): Excision 4/19/2021 Dr. Sahu Pathology: [id number]     Lesion 2: Compound Dysplastic Nevus with moderate to focal severe atypia Year Diagnosed: 2021. February. Location: Left Lateral Lower Leg. Treatment(s): Shave excision 5/3/2021 Dr. Sahu Pathology: [id number]     Lesion 3:  Umbilicus - biopsy 12/2023 - ATYPICAL COMPOUND NEVUS WITH MILD AND FOCAL MODERATE MELANOCYTIC DYSPLASIA, FOCALLY PRESENT AT AN INKED TISSUE EDGE, SEE COMMENT     COMMENT:  Some of the melanocytic dysplasia is likely site-related (special site nevus).  Clinical follow-up of this site with reexcision of any remaining or recurrent lesion is recommended.    5/17/24 - No recurrence of nevus biopsied/excised in umbilicus             Objective   Well appearing patient in no apparent distress; mood and affect are within normal limits.    A full examination was performed including scalp, head, eyes, ears, nose, lips, neck, chest, axillae, abdomen, back, buttocks, bilateral upper extremities, bilateral lower extremities, hands, feet, fingers, toes, fingernails, and toenails. All findings within normal limits unless otherwise noted below.    Assessment/Plan   1. Multiple benign nevi  Brown and tan macules and papules with  reassuring findings on dermoscopy    No recurrence of nevus biopsied/excised in umbilicus    -These lesions have benign, reassuring patterns on dermoscopy  -Recommend continued self observation, and to contact the office if any changes in nevi are noticed    2. Screening exam for skin cancer  As part of a routine Full Skin Exam, a genital examination with the presence of a chaperone was offered. The patient declined the exam and declined the chaperone.       Full body skin exam  -No lesions concerning for malignancy on the remainder the skin exam today   - The ugly duckling sign was discussed. Monitor for any skin lesions that are different in color, shape, or size than others on body  -Sun protection was discussed. Recommend SPF 30+, hats with brims, sun protective clothing, and avoiding sun exposure between 10 AM and 2 PM whenever possible  -Recommend regular skin exams or sooner if new or changing lesions       Related Procedures  Follow Up In Dermatology - Established Patient  Follow Up In Dermatology - Established Patient    3. Lentigo  Tan macules    -Benign appearing on exam  -Reassurance, recommend observation    4. Seborrheic keratosis  Stuck on, waxy macule(s)/papule(s)/plaque(s) with comedo-like openings and milia like cysts    -Discussed the nature of the diagnosis  -Reassurance, recommend continued observation    5. Actinic skin damage  Background of photodamage with hyper- and hypo-pigmented macules on the skin    6. History of dysplastic nevus    7. Personal history of malignant melanoma of skin  Lymph node basins palpated in relevant regions without appreciable adenopathy; regions include the neck and/or supraclavicular and/or axillary and/or inguinal and/or popliteal skin.    Personal History of Malignant Melanoma  -Well healed scar(s) with no evidence of recurrence  -Discussed the need for regular full skin examinations in the office, and monthly self examinations at home  -Discussed the need for  annual ophthalmology exams with dilation  -Discussed concerning lesions and when to return sooner if any changes noted in skin lesions. Patient verbalizes understanding.    8. Personal history of skin cancer    Personal History of Non-Melanoma Skin Cancer  -Well healed scar(s) with no evidence of recurrence  -Discussed the need for annual or semi-annual skin examinations and to return sooner if any new or changing lesions are noticed. Patient verbalizes understanding    - discussed niacinamide for prophy    9. Actinic keratosis (3)  Left Eyebrow, Right Preauricular Area, Right Temple  Erythematous scaly macule(s)    -Discussed nature of diagnosis and treatment options.   -Patient wishes to proceed with Cryotherapy today  -Possible side effects of liquid nitrogen treatment reviewed including formation of blisters, crusting, tenderness, scar, and discoloration which may be permanent.  -Patient advised to return the office for re-evaluation if the treated lesion(s) do not resolve within 4-6 weeks. Patient verbalizes understanding.    Destr of lesion - Left Eyebrow, Right Preauricular Area, Right [place]  Complexity: simple    Destruction method: cryotherapy    Informed consent: discussed and consent obtained    Lesion destroyed using liquid nitrogen: Yes    Outcome: patient tolerated procedure well with no complications    Post-procedure details: wound care instructions given      10. Allergic contact dermatitis due to plant  Right Knee - Anterior  Photo of linear pink plaque, now resolved    Appeared after searching for golf club    Rx clobetasol for future    clobetasol (Temovate) 0.05 % ointment - Right Knee - Anterior  Apply topically 2 times a day. To areas of poison ivy rash on arms, legs, trunk (not safe for face) for up to 2 weeks as needed        Follow up 6 months Full Skin Exam

## 2024-11-24 DIAGNOSIS — J45.30 MILD PERSISTENT ASTHMA WITHOUT COMPLICATION (HHS-HCC): ICD-10-CM

## 2024-11-24 RX ORDER — ALBUTEROL SULFATE 90 UG/1
2 INHALANT RESPIRATORY (INHALATION) EVERY 6 HOURS PRN
Qty: 18 G | Refills: 3 | Status: SHIPPED | OUTPATIENT
Start: 2024-11-24

## 2025-01-24 ASSESSMENT — PROMIS GLOBAL HEALTH SCALE
RATE_PHYSICAL_HEALTH: VERY GOOD
RATE_SOCIAL_SATISFACTION: EXCELLENT
RATE_MENTAL_HEALTH: EXCELLENT
CARRYOUT_SOCIAL_ACTIVITIES: EXCELLENT
RATE_QUALITY_OF_LIFE: EXCELLENT
RATE_GENERAL_HEALTH: VERY GOOD
CARRYOUT_PHYSICAL_ACTIVITIES: COMPLETELY
EMOTIONAL_PROBLEMS: NEVER
RATE_AVERAGE_PAIN: 0

## 2025-01-25 ENCOUNTER — LAB (OUTPATIENT)
Dept: LAB | Facility: LAB | Age: 70
End: 2025-01-25
Payer: MEDICARE

## 2025-01-25 DIAGNOSIS — Z00.00 WELLNESS EXAMINATION: ICD-10-CM

## 2025-01-25 LAB
25(OH)D3 SERPL-MCNC: 35 NG/ML (ref 30–100)
ALBUMIN SERPL BCP-MCNC: 4.5 G/DL (ref 3.4–5)
ALP SERPL-CCNC: 61 U/L (ref 33–136)
ALT SERPL W P-5'-P-CCNC: 21 U/L (ref 10–52)
AMORPH CRY #/AREA UR COMP ASSIST: ABNORMAL /HPF
ANION GAP SERPL CALC-SCNC: 13 MMOL/L (ref 10–20)
APPEARANCE UR: ABNORMAL
AST SERPL W P-5'-P-CCNC: 17 U/L (ref 9–39)
BASOPHILS # BLD AUTO: 0.03 X10*3/UL (ref 0–0.1)
BASOPHILS NFR BLD AUTO: 0.6 %
BILIRUB SERPL-MCNC: 0.8 MG/DL (ref 0–1.2)
BILIRUB UR STRIP.AUTO-MCNC: NEGATIVE MG/DL
BUN SERPL-MCNC: 17 MG/DL (ref 6–23)
CALCIUM SERPL-MCNC: 9.5 MG/DL (ref 8.6–10.6)
CHLORIDE SERPL-SCNC: 106 MMOL/L (ref 98–107)
CHOLEST SERPL-MCNC: 184 MG/DL (ref 0–199)
CHOLESTEROL/HDL RATIO: 2
CO2 SERPL-SCNC: 26 MMOL/L (ref 21–32)
COLOR UR: ABNORMAL
CREAT SERPL-MCNC: 0.78 MG/DL (ref 0.5–1.3)
CRP SERPL HS-MCNC: 0.5 MG/L
EGFRCR SERPLBLD CKD-EPI 2021: >90 ML/MIN/1.73M*2
EOSINOPHIL # BLD AUTO: 0.11 X10*3/UL (ref 0–0.7)
EOSINOPHIL NFR BLD AUTO: 2.3 %
ERYTHROCYTE [DISTWIDTH] IN BLOOD BY AUTOMATED COUNT: 12.4 % (ref 11.5–14.5)
EST. AVERAGE GLUCOSE BLD GHB EST-MCNC: 88 MG/DL
GLUCOSE SERPL-MCNC: 91 MG/DL (ref 74–99)
GLUCOSE UR STRIP.AUTO-MCNC: NORMAL MG/DL
HBA1C MFR BLD: 4.7 %
HCT VFR BLD AUTO: 45.5 % (ref 41–52)
HDLC SERPL-MCNC: 92.6 MG/DL
HGB BLD-MCNC: 15.7 G/DL (ref 13.5–17.5)
IMM GRANULOCYTES # BLD AUTO: 0 X10*3/UL (ref 0–0.7)
IMM GRANULOCYTES NFR BLD AUTO: 0 % (ref 0–0.9)
KETONES UR STRIP.AUTO-MCNC: NEGATIVE MG/DL
LDLC SERPL CALC-MCNC: 74 MG/DL
LEUKOCYTE ESTERASE UR QL STRIP.AUTO: NEGATIVE
LYMPHOCYTES # BLD AUTO: 2.39 X10*3/UL (ref 1.2–4.8)
LYMPHOCYTES NFR BLD AUTO: 50.2 %
MCH RBC QN AUTO: 32.1 PG (ref 26–34)
MCHC RBC AUTO-ENTMCNC: 34.5 G/DL (ref 32–36)
MCV RBC AUTO: 93 FL (ref 80–100)
MONOCYTES # BLD AUTO: 0.47 X10*3/UL (ref 0.1–1)
MONOCYTES NFR BLD AUTO: 9.9 %
NEUTROPHILS # BLD AUTO: 1.76 X10*3/UL (ref 1.2–7.7)
NEUTROPHILS NFR BLD AUTO: 37 %
NITRITE UR QL STRIP.AUTO: NEGATIVE
NON HDL CHOLESTEROL: 91 MG/DL (ref 0–149)
NRBC BLD-RTO: 0 /100 WBCS (ref 0–0)
PH UR STRIP.AUTO: 5.5 [PH]
PLATELET # BLD AUTO: 184 X10*3/UL (ref 150–450)
POTASSIUM SERPL-SCNC: 4 MMOL/L (ref 3.5–5.3)
PROT SERPL-MCNC: 7.4 G/DL (ref 6.4–8.2)
PROT UR STRIP.AUTO-MCNC: ABNORMAL MG/DL
PSA SERPL-MCNC: <0.1 NG/ML
RBC # BLD AUTO: 4.89 X10*6/UL (ref 4.5–5.9)
RBC # UR STRIP.AUTO: NEGATIVE /UL
RBC #/AREA URNS AUTO: ABNORMAL /HPF
SODIUM SERPL-SCNC: 141 MMOL/L (ref 136–145)
SP GR UR STRIP.AUTO: 1.02
SQUAMOUS #/AREA URNS AUTO: ABNORMAL /HPF
TRIGL SERPL-MCNC: 85 MG/DL (ref 0–149)
TSH SERPL-ACNC: 1.33 MIU/L (ref 0.44–3.98)
UROBILINOGEN UR STRIP.AUTO-MCNC: NORMAL MG/DL
VLDL: 17 MG/DL (ref 0–40)
WBC # BLD AUTO: 4.8 X10*3/UL (ref 4.4–11.3)
WBC #/AREA URNS AUTO: ABNORMAL /HPF

## 2025-01-25 PROCEDURE — 81001 URINALYSIS AUTO W/SCOPE: CPT

## 2025-01-25 PROCEDURE — 85025 COMPLETE CBC W/AUTO DIFF WBC: CPT

## 2025-01-25 PROCEDURE — 84153 ASSAY OF PSA TOTAL: CPT

## 2025-01-25 PROCEDURE — 86141 C-REACTIVE PROTEIN HS: CPT

## 2025-01-25 PROCEDURE — 80061 LIPID PANEL: CPT

## 2025-01-25 PROCEDURE — 82306 VITAMIN D 25 HYDROXY: CPT

## 2025-01-25 PROCEDURE — 80053 COMPREHEN METABOLIC PANEL: CPT

## 2025-01-25 PROCEDURE — 83036 HEMOGLOBIN GLYCOSYLATED A1C: CPT

## 2025-01-25 PROCEDURE — 84443 ASSAY THYROID STIM HORMONE: CPT

## 2025-01-29 ENCOUNTER — APPOINTMENT (OUTPATIENT)
Dept: PRIMARY CARE | Facility: CLINIC | Age: 70
End: 2025-01-29
Payer: COMMERCIAL

## 2025-01-29 VITALS
SYSTOLIC BLOOD PRESSURE: 120 MMHG | BODY MASS INDEX: 29.63 KG/M2 | HEIGHT: 70 IN | DIASTOLIC BLOOD PRESSURE: 64 MMHG | HEART RATE: 88 BPM | OXYGEN SATURATION: 94 % | WEIGHT: 207 LBS

## 2025-01-29 DIAGNOSIS — R82.90 ABNORMAL URINALYSIS: ICD-10-CM

## 2025-01-29 DIAGNOSIS — Z85.46 HISTORY OF PROSTATE CANCER: ICD-10-CM

## 2025-01-29 DIAGNOSIS — E78.5 DYSLIPIDEMIA: ICD-10-CM

## 2025-01-29 DIAGNOSIS — Z85.828 HISTORY OF SKIN CANCER: ICD-10-CM

## 2025-01-29 DIAGNOSIS — G47.33 OSA (OBSTRUCTIVE SLEEP APNEA): ICD-10-CM

## 2025-01-29 DIAGNOSIS — Z00.00 WELCOME TO MEDICARE PREVENTIVE VISIT: Primary | ICD-10-CM

## 2025-01-29 DIAGNOSIS — D12.6 TUBULAR ADENOMA OF COLON: ICD-10-CM

## 2025-01-29 DIAGNOSIS — J30.9 ALLERGIC RHINITIS, UNSPECIFIED SEASONALITY, UNSPECIFIED TRIGGER: ICD-10-CM

## 2025-01-29 DIAGNOSIS — I77.810 ASCENDING AORTA DILATATION (CMS-HCC): ICD-10-CM

## 2025-01-29 DIAGNOSIS — E55.9 VITAMIN D DEFICIENCY: ICD-10-CM

## 2025-01-29 DIAGNOSIS — I35.1 MILD AORTIC REGURGITATION: ICD-10-CM

## 2025-01-29 DIAGNOSIS — Z23 NEED FOR VACCINATION: ICD-10-CM

## 2025-01-29 DIAGNOSIS — I10 ESSENTIAL HYPERTENSION: ICD-10-CM

## 2025-01-29 DIAGNOSIS — Z85.820 HISTORY OF MELANOMA: ICD-10-CM

## 2025-01-29 DIAGNOSIS — Z00.00 WELLNESS EXAMINATION: ICD-10-CM

## 2025-01-29 DIAGNOSIS — J45.30 MILD PERSISTENT ASTHMA WITHOUT COMPLICATION (HHS-HCC): ICD-10-CM

## 2025-01-29 PROBLEM — C43.9 MELANOMA (MULTI): Status: RESOLVED | Noted: 2018-02-06 | Resolved: 2025-01-29

## 2025-01-29 PROBLEM — E66.811 CLASS 1 OBESITY DUE TO EXCESS CALORIES WITHOUT SERIOUS COMORBIDITY WITH BODY MASS INDEX (BMI) OF 32.0 TO 32.9 IN ADULT: Status: RESOLVED | Noted: 2024-01-16 | Resolved: 2025-01-29

## 2025-01-29 PROBLEM — C44.92 SCCA (SQUAMOUS CELL CARCINOMA) OF SKIN: Status: RESOLVED | Noted: 2018-02-06 | Resolved: 2025-01-29

## 2025-01-29 PROBLEM — E66.09 CLASS 1 OBESITY DUE TO EXCESS CALORIES WITHOUT SERIOUS COMORBIDITY WITH BODY MASS INDEX (BMI) OF 32.0 TO 32.9 IN ADULT: Status: RESOLVED | Noted: 2024-01-16 | Resolved: 2025-01-29

## 2025-01-29 PROBLEM — C44.91 BCC (BASAL CELL CARCINOMA OF SKIN): Status: RESOLVED | Noted: 2018-02-06 | Resolved: 2025-01-29

## 2025-01-29 PROCEDURE — 90677 PCV20 VACCINE IM: CPT | Performed by: INTERNAL MEDICINE

## 2025-01-29 PROCEDURE — 3078F DIAST BP <80 MM HG: CPT | Performed by: INTERNAL MEDICINE

## 2025-01-29 PROCEDURE — 1159F MED LIST DOCD IN RCRD: CPT | Performed by: INTERNAL MEDICINE

## 2025-01-29 PROCEDURE — 3008F BODY MASS INDEX DOCD: CPT | Performed by: INTERNAL MEDICINE

## 2025-01-29 PROCEDURE — G0009 ADMIN PNEUMOCOCCAL VACCINE: HCPCS | Performed by: INTERNAL MEDICINE

## 2025-01-29 PROCEDURE — 3074F SYST BP LT 130 MM HG: CPT | Performed by: INTERNAL MEDICINE

## 2025-01-29 PROCEDURE — G0403 EKG FOR INITIAL PREVENT EXAM: HCPCS | Performed by: INTERNAL MEDICINE

## 2025-01-29 PROCEDURE — UHSPHYS PR UH SELECT PHYSICAL: Performed by: INTERNAL MEDICINE

## 2025-01-29 PROCEDURE — G0402 INITIAL PREVENTIVE EXAM: HCPCS | Performed by: INTERNAL MEDICINE

## 2025-01-29 RX ORDER — ACETAMINOPHEN 500 MG
5000 TABLET ORAL DAILY
Start: 2025-01-29

## 2025-01-29 NOTE — PROGRESS NOTES
Zak Brizuela Jr. is a 69 y.o. year old here for a Welcome to Medicare Physical     Health Risk Assessment  In general, health is:  Very good      Concerns with balance:  No     Concerns with teeth or dentures:  No     Concerns with sexual function:  No     Felt anxious, stressed, angry, irritable, lonely, isolated, or had thoughts of hurting themself:  No     Has little interest or pleasure in doing things:  No     Bothered by feeling down, depressed, or hopeless:  No     Needs help with grocery shopping, cooking, housework, bathing, grooming, dressing, eating, sitting or standing, walking, using the toilet, handling finances, taking medications, using the telephone, or driving:  No     Following safety precautions in the home environment and vehicle: removed throw rugs from floors, installed grab bars in the bathroom, handrails in stairwells, having adequate lighting, wearing seatbelt at all times?:  Yes     Smokes cigarettes, vapes, or chew tobacco:  No     Eats healthy foods including fruits, vegetables, whole grains, and fiber-rich foods:  Daily     Number of days per week engages in exercise:  4-5 days     Average alcohol consumption: 14 drinks per week        Current Providers  Specialists: I have reviewed specialist-related care of the patient in the medical record.     Medical/Family history review  Reviewed and updated problem list, medical/surgical/family/social history, medications, and allergies.     Opioid use review  Patient use of opioids:  No           Depression screening  Depression Screening PHQ-2 Score   2/14/2023 0         Depression screening tool completed and reviewed. Based on score and interview, patient is not at risk for depression. Screening tool discussed with patient, and I recommended no further intervention at this time.     Cognitive screening  Mini Cog Score:  5/5     Cognitive screening reviewed and plan:  Yes     Functional Observation  Was the patient's timed Up & Go test  "unsteady or >= 12 seconds?  No     Advance Care Planning  End of Life planning discussed, including patient's advanced directive wishes:  Yes    Vision and Hearing assessment  Visual acuity (required for Welcome to Medicare):  Ophthalmology  Hearing Evaluation:  Normal    ====================================================    /64 (BP Location: Left arm, Patient Position: Sitting, BP Cuff Size: Adult)   Pulse 88   Ht 1.778 m (5' 10\")   Wt 93.9 kg (207 lb)   SpO2 94%   BMI 29.70 kg/m²     Chief Complaint   Patient presents with    Annual Exam     Mini-cog score 5/5       Wellness exam/physical ([facility])    Essential hypertension  Blood pressure doing well on current regimen of metoprolol and losartan    Dyslipidemia  Current therapy with atorvastatin 40 mg daily.  Healthy diet and regular exercise as well as 25 pound weight loss achieved.  Lipid panel is at goal on current regimen  [facility] cardiology, next appointment in March (with Elaina Ellison NP)    Aortic regurgitation  Ascending aorta dilation  No chest pain, shortness of breath, etc.  [facility] cardiology, next appointment in March    Mild persistent asthma  Allergic rhinitis  Recent visit with [facility] pulmonology, Dr. Muniz, who will be reducing Wixela from 250/50 to 100/50 once current prescription runs out.  Patient is doing well without need for rescue inhaler.  He denies any current coughing, wheezing, shortness of breath.    History of colon polyps  Family history of colon cancer  Next screening colonoscopy due in 5/2029    Obesity   25 pound weight loss on tirzepatide, current dose 10 mg weekly (per [facility])  Medication has been well-tolerated other than minor GI symptoms.    History of melanoma/skin cancer  Up-to-date with dermatology, Dr. Nina Slater.    Vitamin D deficiency  Patient remains on vitamin D 5000 IU daily    Obstructive sleep apnea  Patient has oral appliance but has not been using.  25 pound weight loss.  Patient " reports that his wife states there is no snoring, even without oral appliance use.      Abnormal urinalysis (incorrect collection)  Repeat mid-stream urinalysis    History of prostate cancer   PSA is undetectable    Health maintenance  Exercise: Racquet sports 3-4 times per week, walking daily  Colon cancer screening: Next due in 5/2029  Ophthalmology: Dr. Justin Phan, up-to-date  Dentistry: Up-to-date        Review of Systems   Constitutional: Negative for malaise/fatigue.   HENT:  Negative for hearing loss and sore throat.    Eyes:  Negative for blurred vision and visual disturbance.   Cardiovascular:  Negative for chest pain, dyspnea on exertion and palpitations.   Respiratory:  Negative for cough, shortness of breath and wheezing.    Skin:  Negative for rash.   Musculoskeletal:  Negative for back pain, joint pain and muscle cramps.   Gastrointestinal:  Negative for abdominal pain, constipation, diarrhea and heartburn.   Genitourinary:  Negative for dysuria, frequency and urgency.   Neurological:  Negative for dizziness, headaches, light-headedness and numbness.   Psychiatric/Behavioral:  Negative for depression. The patient is not nervous/anxious.         Physical Exam  Vitals reviewed.   Constitutional:       Appearance: Normal appearance.   HENT:      Head: Normocephalic.      Right Ear: Tympanic membrane normal.      Left Ear: Tympanic membrane normal.      Mouth/Throat:      Mouth: Mucous membranes are moist.      Pharynx: No oropharyngeal exudate or posterior oropharyngeal erythema.   Eyes:      Conjunctiva/sclera: Conjunctivae normal.   Cardiovascular:      Rate and Rhythm: Normal rate and regular rhythm.      Heart sounds: Murmur heard.      Diastolic murmur is present with a grade of 1/4.   Pulmonary:      Effort: Pulmonary effort is normal.      Breath sounds: Normal breath sounds.   Abdominal:      General: Bowel sounds are normal.      Palpations: Abdomen is soft.      Tenderness: There is no  abdominal tenderness.   Musculoskeletal:         General: Normal range of motion.      Right lower leg: No edema.      Left lower leg: No edema.   Lymphadenopathy:      Cervical: No cervical adenopathy.   Neurological:      General: No focal deficit present.      Mental Status: He is alert and oriented to person, place, and time.   Psychiatric:         Mood and Affect: Mood normal.           Assessment and Plan    Welcome to Medicare Physical  Continued regular exercise recommended 3 to 5 days/week  Well-balanced diet rich in fruits, vegetables, fiber, lean protein recommended  Continued routine follow-up with ophthalmology (Dr. Phan) and dentistry recommended  Prevnar-20 vaccine given today  COVID-19 booster vaccine recommended to receive in February  Providing copy of advance directives (DURABLE POWER OF  for healthcare) to place in chart recommended    ============================    Wellness exam/physical ([facility])  Continued regular exercise recommended 3 to 5 days/week  Well-balanced diet rich in fruits, vegetables, fiber, lean protein recommended  Continued routine follow-up with ophthalmology (Dr. Phan) and dentistry recommended  Prevnar-20 vaccine given today  COVID-19 booster vaccine recommended to receive in February  Providing copy of advance directives (DURABLE POWER OF  for healthcare) to place in chart recommended  Referral to T3 training assessmen    Essential hypertension  Continue metoprolol and losartan at current dosing  Continue home blood pressure monitoring.    Dyslipidemia (lipid panel at goal)  Continue healthy lifestyle with proper diet and exercise  Continue atorvastatin 40 mg daily  Continue routine follow-up with [facility] cardiology, next appointment in March    Aortic regurgitation  Ascending aorta dilation  Continue routine follow-up with [facility] cardiology, next appointment in March    Mild persistent asthma  Allergic rhinitis  Continue Wixela 250/50 until runs out, then  transition to Wixela 100/50, 1 puff twice a day (per Dr. Muniz, [facility] pulmonary medicine)  Continue loratadine 10 mg daily as needed    History of colon polyps  Family history of colon cancer  Next screening colonoscopy due in 5/2029    Obesity (25 pound weight loss on tirzepatide)  Continue healthy lifestyle with proper diet and exercise  Continue tirzepatide 10 mg weekly (per [facility])    History of melanoma/skin cancer  Continue routine follow-up with dermatology, Dr. Nina Slater.  Continue to limit sun exposure and use sunscreen regularly    Vitamin D deficiency  Continue vitamin D 5000 IU daily (with food)    Obstructive sleep apnea (not currently using oral appliance, no snoring), improved with weight loss  Monitor expectantly    Abnormal urinalysis  Repeat mid-stream urinalysis    History of prostate cancer (PSA undetectable)  Monitor PSA annually  Follow-up with Dr. De La Rosa, [facility] urology annually    Follow-up  1.  Office visit in July 2025  (Fasting lab work ordered, to be completed 3 to 5 days prior)  2.  Wellness exam/physical in 1 year, on/after 1/30/2026    Hang Dugan MD

## 2025-01-29 NOTE — PATIENT INSTRUCTIONS
Wellness exam/physical ([facility])  Continued regular exercise recommended 3 to 5 days/week  Well-balanced diet rich in fruits, vegetables, fiber, lean protein recommended  Continued routine follow-up with ophthalmology (Dr. Phan) and dentistry recommended  Prevnar-20 vaccine given today  COVID-19 booster vaccine recommended to receive in February  Providing copy of advance directives (DURABLE POWER OF  for healthcare) to place in chart recommended  Referral to T3 training assessmen    Essential hypertension  Continue metoprolol and losartan at current dosing  Continue home blood pressure monitoring.    Dyslipidemia (lipid panel at goal)  Continue healthy lifestyle with proper diet and exercise  Continue atorvastatin 40 mg daily  Continue routine follow-up with [facility] cardiology, next appointment in March    Aortic regurgitation  Ascending aorta dilation  Continue routine follow-up with [facility] cardiology, next appointment in March    Mild persistent asthma  Allergic rhinitis  Continue Wixela 250/50 until runs out, then transition to Wixela 100/50, 1 puff twice a day (per Dr. Muniz, [facility])  Continue loratadine 10 mg daily as needed    History of colon polyps  Family history of colon cancer  Next screening colonoscopy due in 5/2029    Obesity (25 pound weight loss on tirzepatide)  Continue healthy lifestyle with proper diet and exercise  Continue tirzepatide 10 mg weekly (per [facility])    History of melanoma/skin cancer  Continue routine follow-up with dermatology, Dr. Nina Slater.  Continue to limit sun exposure and use sunscreen regularly    Vitamin D deficiency  Continue vitamin D 5000 IU daily (with food)    Obstructive sleep apnea (not currently using oral appliance, no snoring), improved with weight loss  Monitor expectantly    Abnormal urinalysis  Repeat mid-stream urinalysis    History of prostate cancer (PSA undetectable)  Monitor PSA annually  Follow-up with   [facility] urology annually    Follow-up  1.  Office visit in July 2025  (Fasting lab work ordered, to be completed 3 to 5 days prior)  2.  Wellness exam/physical in 1 year, on/after 1/30/2026

## 2025-01-30 ASSESSMENT — ENCOUNTER SYMPTOMS
NERVOUS/ANXIOUS: 0
CONSTIPATION: 0
DEPRESSION: 0
COUGH: 0
MUSCLE CRAMPS: 0
WHEEZING: 0
HEADACHES: 0
FREQUENCY: 0
ABDOMINAL PAIN: 0
SHORTNESS OF BREATH: 0
SORE THROAT: 0
DYSURIA: 0
DIARRHEA: 0
BLURRED VISION: 0
PALPITATIONS: 0
DYSPNEA ON EXERTION: 0
NUMBNESS: 0
DIZZINESS: 0
HEARTBURN: 0
BACK PAIN: 0
LIGHT-HEADEDNESS: 0

## 2025-02-14 ENCOUNTER — APPOINTMENT (OUTPATIENT)
Dept: RADIOLOGY | Facility: HOSPITAL | Age: 70
End: 2025-02-14
Payer: MEDICARE

## 2025-02-14 ENCOUNTER — HOSPITAL ENCOUNTER (EMERGENCY)
Facility: HOSPITAL | Age: 70
Discharge: HOME | End: 2025-02-14
Payer: MEDICARE

## 2025-02-14 VITALS
WEIGHT: 205 LBS | TEMPERATURE: 97.3 F | RESPIRATION RATE: 16 BRPM | OXYGEN SATURATION: 96 % | DIASTOLIC BLOOD PRESSURE: 78 MMHG | SYSTOLIC BLOOD PRESSURE: 151 MMHG | BODY MASS INDEX: 29.35 KG/M2 | HEART RATE: 95 BPM | HEIGHT: 70 IN

## 2025-02-14 DIAGNOSIS — S92.325A CLOSED NONDISPLACED FRACTURE OF SECOND METATARSAL BONE OF LEFT FOOT, INITIAL ENCOUNTER: ICD-10-CM

## 2025-02-14 DIAGNOSIS — S92.332B: Primary | ICD-10-CM

## 2025-02-14 PROCEDURE — 73564 X-RAY EXAM KNEE 4 OR MORE: CPT | Mod: LEFT SIDE | Performed by: RADIOLOGY

## 2025-02-14 PROCEDURE — 73564 X-RAY EXAM KNEE 4 OR MORE: CPT | Mod: LT

## 2025-02-14 PROCEDURE — 2500000001 HC RX 250 WO HCPCS SELF ADMINISTERED DRUGS (ALT 637 FOR MEDICARE OP): Performed by: NURSE PRACTITIONER

## 2025-02-14 PROCEDURE — 99284 EMERGENCY DEPT VISIT MOD MDM: CPT | Mod: 25

## 2025-02-14 PROCEDURE — 73590 X-RAY EXAM OF LOWER LEG: CPT | Mod: RT

## 2025-02-14 PROCEDURE — 73610 X-RAY EXAM OF ANKLE: CPT | Mod: LT

## 2025-02-14 PROCEDURE — 70450 CT HEAD/BRAIN W/O DYE: CPT | Performed by: RADIOLOGY

## 2025-02-14 PROCEDURE — 73610 X-RAY EXAM OF ANKLE: CPT | Mod: LEFT SIDE | Performed by: RADIOLOGY

## 2025-02-14 PROCEDURE — 73590 X-RAY EXAM OF LOWER LEG: CPT | Mod: RIGHT SIDE | Performed by: RADIOLOGY

## 2025-02-14 PROCEDURE — 73630 X-RAY EXAM OF FOOT: CPT | Mod: LEFT SIDE | Performed by: RADIOLOGY

## 2025-02-14 PROCEDURE — 73630 X-RAY EXAM OF FOOT: CPT | Mod: LT

## 2025-02-14 PROCEDURE — 70450 CT HEAD/BRAIN W/O DYE: CPT

## 2025-02-14 RX ORDER — ACETAMINOPHEN 325 MG/1
975 TABLET ORAL ONCE
Status: COMPLETED | OUTPATIENT
Start: 2025-02-14 | End: 2025-02-14

## 2025-02-14 RX ORDER — OXYCODONE AND ACETAMINOPHEN 5; 325 MG/1; MG/1
1 TABLET ORAL 2 TIMES DAILY
Qty: 6 TABLET | Refills: 0 | Status: SHIPPED | OUTPATIENT
Start: 2025-02-14 | End: 2025-02-16

## 2025-02-14 RX ADMIN — ACETAMINOPHEN 975 MG: 325 TABLET, FILM COATED ORAL at 19:57

## 2025-02-14 ASSESSMENT — COLUMBIA-SUICIDE SEVERITY RATING SCALE - C-SSRS
2. HAVE YOU ACTUALLY HAD ANY THOUGHTS OF KILLING YOURSELF?: NO
1. IN THE PAST MONTH, HAVE YOU WISHED YOU WERE DEAD OR WISHED YOU COULD GO TO SLEEP AND NOT WAKE UP?: NO
6. HAVE YOU EVER DONE ANYTHING, STARTED TO DO ANYTHING, OR PREPARED TO DO ANYTHING TO END YOUR LIFE?: NO

## 2025-02-14 ASSESSMENT — PAIN DESCRIPTION - PAIN TYPE: TYPE: ACUTE PAIN

## 2025-02-14 ASSESSMENT — PAIN DESCRIPTION - ORIENTATION: ORIENTATION: RIGHT;LEFT

## 2025-02-14 ASSESSMENT — PAIN DESCRIPTION - DESCRIPTORS: DESCRIPTORS: SHARP

## 2025-02-14 ASSESSMENT — PAIN - FUNCTIONAL ASSESSMENT: PAIN_FUNCTIONAL_ASSESSMENT: 0-10

## 2025-02-14 ASSESSMENT — PAIN SCALES - GENERAL: PAINLEVEL_OUTOF10: 9

## 2025-02-14 ASSESSMENT — PAIN DESCRIPTION - LOCATION: LOCATION: LEG
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
## 2025-02-16 ENCOUNTER — TELEPHONE (OUTPATIENT)
Dept: PRIMARY CARE | Facility: CLINIC | Age: 70
End: 2025-02-16
Payer: MEDICARE

## 2025-02-16 PROBLEM — S92.332D CLOSED DISPLACED FRACTURE OF THIRD METATARSAL BONE OF LEFT FOOT WITH ROUTINE HEALING: Status: ACTIVE | Noted: 2025-02-16
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
## 2025-02-17 ENCOUNTER — OFFICE VISIT (OUTPATIENT)
Dept: ORTHOPEDIC SURGERY | Facility: CLINIC | Age: 70
End: 2025-02-17
Payer: MEDICARE

## 2025-02-17 ENCOUNTER — TELEPHONE (OUTPATIENT)
Dept: PRIMARY CARE | Facility: CLINIC | Age: 70
End: 2025-02-17

## 2025-02-17 VITALS — BODY MASS INDEX: 29.35 KG/M2 | WEIGHT: 205 LBS | HEIGHT: 70 IN

## 2025-02-17 DIAGNOSIS — S92.332D CLOSED DISPLACED FRACTURE OF THIRD METATARSAL BONE OF LEFT FOOT WITH ROUTINE HEALING: Primary | ICD-10-CM

## 2025-02-17 DIAGNOSIS — M17.11 PRIMARY OSTEOARTHRITIS OF RIGHT KNEE: ICD-10-CM

## 2025-02-17 DIAGNOSIS — T14.8XXA HEMATOMA: ICD-10-CM

## 2025-02-17 DIAGNOSIS — M17.12 PRIMARY OSTEOARTHRITIS OF LEFT KNEE: ICD-10-CM

## 2025-02-17 DIAGNOSIS — T14.8XXA CONTUSION OF BONE: ICD-10-CM

## 2025-02-17 PROCEDURE — 1036F TOBACCO NON-USER: CPT | Performed by: ORTHOPAEDIC SURGERY

## 2025-02-17 PROCEDURE — 99214 OFFICE O/P EST MOD 30 MIN: CPT | Performed by: ORTHOPAEDIC SURGERY

## 2025-02-17 PROCEDURE — 1125F AMNT PAIN NOTED PAIN PRSNT: CPT | Performed by: ORTHOPAEDIC SURGERY

## 2025-02-17 PROCEDURE — 3008F BODY MASS INDEX DOCD: CPT | Performed by: ORTHOPAEDIC SURGERY

## 2025-02-17 ASSESSMENT — PAIN DESCRIPTION - DESCRIPTORS: DESCRIPTORS: ACHING;SHARP

## 2025-02-17 ASSESSMENT — PAIN SCALES - GENERAL: PAINLEVEL_OUTOF10: 6

## 2025-02-17 ASSESSMENT — PAIN - FUNCTIONAL ASSESSMENT: PAIN_FUNCTIONAL_ASSESSMENT: 0-10
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
## 2025-02-24 DIAGNOSIS — J45.30 MILD PERSISTENT ASTHMA WITHOUT COMPLICATION (HHS-HCC): ICD-10-CM

## 2025-02-24 RX ORDER — ALBUTEROL SULFATE 90 UG/1
2 INHALANT RESPIRATORY (INHALATION) EVERY 6 HOURS PRN
Qty: 18 G | Refills: 3 | Status: SHIPPED | OUTPATIENT
Start: 2025-02-24

## 2025-03-03 ENCOUNTER — OFFICE VISIT (OUTPATIENT)
Dept: ORTHOPEDIC SURGERY | Facility: CLINIC | Age: 70
End: 2025-03-03
Payer: MEDICARE

## 2025-03-03 ENCOUNTER — HOSPITAL ENCOUNTER (OUTPATIENT)
Dept: RADIOLOGY | Facility: CLINIC | Age: 70
Discharge: HOME | End: 2025-03-03
Payer: MEDICARE

## 2025-03-03 DIAGNOSIS — S92.332D CLOSED DISPLACED FRACTURE OF THIRD METATARSAL BONE OF LEFT FOOT WITH ROUTINE HEALING: ICD-10-CM

## 2025-03-03 PROCEDURE — 1036F TOBACCO NON-USER: CPT | Performed by: ORTHOPAEDIC SURGERY

## 2025-03-03 PROCEDURE — L3260 AMBULATORY SURGICAL BOOT EAC: HCPCS | Performed by: ORTHOPAEDIC SURGERY

## 2025-03-03 PROCEDURE — 99213 OFFICE O/P EST LOW 20 MIN: CPT | Performed by: ORTHOPAEDIC SURGERY

## 2025-03-03 PROCEDURE — 1159F MED LIST DOCD IN RCRD: CPT | Performed by: ORTHOPAEDIC SURGERY

## 2025-03-03 PROCEDURE — 73630 X-RAY EXAM OF FOOT: CPT | Mod: LT

## 2025-03-03 NOTE — PROGRESS NOTES
Returns for both legs.  Still having a lot of pain in his right anterior tibia.  Using a walker around the house for support.  Feels like he is full weightbearing in the boot on the left side with reasonable pain control.    Exam: Right leg obvious bruising across anterior compartment.  Soft.  Intact dorsiflexion.  Full range of motion of both knees.  Left foot: Tender over second and third metatarsal fracture sites.  Minimal swelling.  No deformity.    I personally reviewed the following radiographic exams: Left foot shows healing second metatarsal nondisplaced fracture.  Healing comminuted left third metatarsal fracture.  Good alignment.    Assessment: Healing left second and third metatarsal fracture.  Right pretibial contusion.    Plan: Will try to transition into a surgical shoe on the left foot.  I think he is healed enough if he is comfortable to do so.  He always has the boot at his disposal.  I think his right shin will continue to improve as the hematoma resolves.  He is going to [place] in a few weeks.  Will call with any problems otherwise follow-up x-ray of left foot in 1 month.

## 2025-03-20 ENCOUNTER — APPOINTMENT (OUTPATIENT)
Dept: ORTHOPEDIC SURGERY | Facility: CLINIC | Age: 70
End: 2025-03-20
Payer: MEDICARE

## 2025-03-31 ENCOUNTER — APPOINTMENT (OUTPATIENT)
Dept: ORTHOPEDIC SURGERY | Facility: CLINIC | Age: 70
End: 2025-03-31
Payer: MEDICARE

## 2025-03-31 ENCOUNTER — HOSPITAL ENCOUNTER (OUTPATIENT)
Dept: RADIOLOGY | Facility: CLINIC | Age: 70
Discharge: HOME | End: 2025-03-31
Payer: MEDICARE

## 2025-03-31 DIAGNOSIS — M17.11 PRIMARY OSTEOARTHRITIS OF RIGHT KNEE: Primary | ICD-10-CM

## 2025-03-31 DIAGNOSIS — T14.8XXA HEMATOMA: ICD-10-CM

## 2025-03-31 DIAGNOSIS — M17.12 PRIMARY OSTEOARTHRITIS OF LEFT KNEE: ICD-10-CM

## 2025-03-31 DIAGNOSIS — S92.332D CLOSED DISPLACED FRACTURE OF THIRD METATARSAL BONE OF LEFT FOOT WITH ROUTINE HEALING: ICD-10-CM

## 2025-03-31 DIAGNOSIS — T14.8XXA CONTUSION OF BONE: ICD-10-CM

## 2025-03-31 PROCEDURE — 73630 X-RAY EXAM OF FOOT: CPT | Mod: LEFT SIDE | Performed by: RADIOLOGY

## 2025-03-31 PROCEDURE — 99213 OFFICE O/P EST LOW 20 MIN: CPT | Performed by: ORTHOPAEDIC SURGERY

## 2025-03-31 PROCEDURE — 1036F TOBACCO NON-USER: CPT | Performed by: ORTHOPAEDIC SURGERY

## 2025-03-31 PROCEDURE — 73630 X-RAY EXAM OF FOOT: CPT | Mod: LT

## 2025-03-31 PROCEDURE — 1159F MED LIST DOCD IN RCRD: CPT | Performed by: ORTHOPAEDIC SURGERY
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
## 2025-04-18 ENCOUNTER — EVALUATION (OUTPATIENT)
Dept: PHYSICAL THERAPY | Facility: CLINIC | Age: 70
End: 2025-04-18
Payer: MEDICARE

## 2025-04-18 DIAGNOSIS — T14.8XXA HEMATOMA: ICD-10-CM

## 2025-04-18 DIAGNOSIS — S92.332D CLOSED DISPLACED FRACTURE OF THIRD METATARSAL BONE OF LEFT FOOT WITH ROUTINE HEALING: ICD-10-CM

## 2025-04-18 DIAGNOSIS — M17.11 PRIMARY OSTEOARTHRITIS OF RIGHT KNEE: ICD-10-CM

## 2025-04-18 DIAGNOSIS — M62.81 MUSCLE WEAKNESS: Primary | ICD-10-CM

## 2025-04-18 DIAGNOSIS — M17.12 PRIMARY OSTEOARTHRITIS OF LEFT KNEE: ICD-10-CM

## 2025-04-18 DIAGNOSIS — T14.8XXA CONTUSION OF BONE: ICD-10-CM

## 2025-04-18 PROCEDURE — 97110 THERAPEUTIC EXERCISES: CPT | Mod: GP

## 2025-04-18 PROCEDURE — 97535 SELF CARE MNGMENT TRAINING: CPT | Mod: GP

## 2025-04-18 PROCEDURE — 97161 PT EVAL LOW COMPLEX 20 MIN: CPT | Mod: GP

## 2025-04-18 ASSESSMENT — PAIN - FUNCTIONAL ASSESSMENT: PAIN_FUNCTIONAL_ASSESSMENT: 0-10

## 2025-04-18 ASSESSMENT — PAIN SCALES - GENERAL: PAINLEVEL_OUTOF10: 1
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
## 2025-04-25 ENCOUNTER — APPOINTMENT (OUTPATIENT)
Dept: PHYSICAL THERAPY | Facility: CLINIC | Age: 70
End: 2025-04-25
Payer: MEDICARE

## 2025-04-25 NOTE — PROGRESS NOTES
Physical Therapy  Physical Therapy Treatment    Patient Name: Zak Brizuela Jr.  MRN: [id number]  Today's Date: 4/25/2025       Insurance:  Visit number: 2 of MN  Authorization info: none  Insurance Type: medicare & MMO medicare  Cert date start: ***        Cert date end: ***       General:  Reason for visit: Closed displaced fracture of third metatarsal bone of left foot with routine healing  Primary osteoarthritis of left knee  Primary osteoarthritis of right knee  Referred by: Dr. Calzada               General       Current Problem  No diagnosis found.              Subjective:   Patient reports ***  HEP Performed:  {Yes/No:07838}    Objective:   {Spine,UE,LE,HAND,LYMPHEDEMA:44998}      Treatments:       Charges:     Access Code: [email or web address]  URL: [email or web address]  Date: 04/18/2025  Prepared by: Brady Booth    Exercises  - Clamshell with Resistance  - 1 x daily - 7 x weekly - 3 sets - 10 reps - 10 hold  - Clamshell with Resistance (Mirrored)  - 1 x daily - 7 x weekly - 3 sets - 10 reps - 10 hold  - Ankle Pumps in Elevation  - 3 x daily - 7 x weekly - 20 reps  - Ankle Circles in Elevation  - 3 x daily - 7 x weekly - 20 reps  - Long Sitting Quad Set with Towel Roll Under Heel  - 2 x daily - 7 x weekly - 3 sets - 10 reps - 6 hold  - Active Straight Leg Raise with Quad Set  - 1 x daily - 7 x weekly - 3 sets - 10 reps  - Sidelying Hip Abduction  - 1 x daily - 7 x weekly - 3 sets - 10 reps - 3-5 hold  - Sidelying Hip Abduction (Mirrored)  - 1 x daily - 7 x weekly - 3 sets - 10 reps - 3-5 hold    Assessment:   The focus of the session was {Treatment:48512}. The pt demonstrated {tolerance to rehab:36809} tolerance to the noted exercises today. The pt is demonstrated {tolerance to rehab:10799} progress in skilled rehab at this time. The pt is still limited in overall {limitations:01745} at this time. The pt continues to be a good candidate for skilled PT, in order to further improve  {limitations:32806}.       Plan: ***    Brady Booth PT, DPT #[id number]

## 2025-05-01 ENCOUNTER — TREATMENT (OUTPATIENT)
Dept: PHYSICAL THERAPY | Facility: CLINIC | Age: 70
End: 2025-05-01
Payer: MEDICARE

## 2025-05-01 DIAGNOSIS — M17.11 PRIMARY OSTEOARTHRITIS OF RIGHT KNEE: ICD-10-CM

## 2025-05-01 DIAGNOSIS — M62.81 MUSCLE WEAKNESS: ICD-10-CM

## 2025-05-01 DIAGNOSIS — S92.332D CLOSED DISPLACED FRACTURE OF THIRD METATARSAL BONE OF LEFT FOOT WITH ROUTINE HEALING: ICD-10-CM

## 2025-05-01 DIAGNOSIS — T14.8XXA HEMATOMA: ICD-10-CM

## 2025-05-01 DIAGNOSIS — M17.12 PRIMARY OSTEOARTHRITIS OF LEFT KNEE: ICD-10-CM

## 2025-05-01 DIAGNOSIS — T14.8XXA CONTUSION OF BONE: ICD-10-CM

## 2025-05-01 PROCEDURE — 97535 SELF CARE MNGMENT TRAINING: CPT | Mod: GP

## 2025-05-01 PROCEDURE — 97140 MANUAL THERAPY 1/> REGIONS: CPT | Mod: GP

## 2025-05-01 PROCEDURE — 97110 THERAPEUTIC EXERCISES: CPT | Mod: GP

## 2025-05-01 ASSESSMENT — PAIN - FUNCTIONAL ASSESSMENT: PAIN_FUNCTIONAL_ASSESSMENT: 0-10

## 2025-05-01 NOTE — PROGRESS NOTES
Physical Therapy  Physical Therapy Treatment    Patient Name: Zak Brizuela Jr.  MRN: [id number]  Today's Date: 5/1/2025  Time Calculation  Start Time: 0921  Stop Time: 1004  Time Calculation (min): 43 min    Insurance:  Visit number: 2 of MN  Authorization info: none  Insurance Type: medicare & MMO medicare  Cert date start: 4/18/25        Cert date end: 7/17/25       General:  Reason for visit: Closed displaced fracture of third metatarsal bone of left foot with routine healing  Primary osteoarthritis of left knee  Primary osteoarthritis of right knee  Referred by: Dr. aClzada               Current Problem  1. Primary osteoarthritis of right knee  Follow Up In Physical Therapy      2. Primary osteoarthritis of left knee  Follow Up In Physical Therapy      3. Closed displaced fracture of third metatarsal bone of left foot with routine healing  Follow Up In Physical Therapy      4. Contusion of bone  Follow Up In Physical Therapy      5. Hematoma  Follow Up In Physical Therapy      6. Muscle weakness  Follow Up In Physical Therapy               Pain Assessment: 0-10    Subjective:   Patient reports doing okay , need to go over exercises again.  HEP Performed:  Partially    Objective:   ANKLE    Functional Rating Scale     Observation     Ankle Palpation/Joint Mobility Assessment  Palpation/Joint Mobility Comment: figure 8= 58.5 L; 56.5 = R; malleolar L= 28; R= 27.5cm  Ankle AROM     Ankle PROM     Ankle MMT         Treatments:   Upright bike x 5 min res 6  Floss band PROM x 4 min ankle x 4 min R knee  Floss band AROM x 20 ankle pumps xc 15 cw/ccw circles L ankle  Patellar mobs x 5 min  Tibial distraction x 4min  STM/ MFR quad/ ITB/ HS x 4 min  Banded TKE x 15 - 5 sec blk band  HEP review and edu x 15 min    Charges: TE x1 man x 1 self care x 1    Access Code: [email or web address]  URL: [email or web address]  Date: 04/18/2025  Prepared by: Brady Robles with Resistance  - 1 x  daily - 7 x weekly - 3 sets - 10 reps - 10 hold  - Clamshell with Resistance (Mirrored)  - 1 x daily - 7 x weekly - 3 sets - 10 reps - 10 hold  - Ankle Pumps in Elevation  - 3 x daily - 7 x weekly - 20 reps  - Ankle Circles in Elevation  - 3 x daily - 7 x weekly - 20 reps  - Long Sitting Quad Set with Towel Roll Under Heel  - 2 x daily - 7 x weekly - 3 sets - 10 reps - 6 hold  - Active Straight Leg Raise with Quad Set  - 1 x daily - 7 x weekly - 3 sets - 10 reps  - Sidelying Hip Abduction  - 1 x daily - 7 x weekly - 3 sets - 10 reps - 3-5 hold  - Sidelying Hip Abduction (Mirrored)  - 1 x daily - 7 x weekly - 3 sets - 10 reps - 3-5 hold    Assessment:   The focus of the session was Strengthening, ROM, Stretching, joint mobilizations, soft tissue massage, Motor Control, and Effusion management. The pt demonstrated Good and Improving tolerance to the noted exercises today. The pt is demonstrated Good and Improving progress in skilled rehab at this time. The pt is still limited in overall Strength, ROM, Flexibility, Motor control, Balance, Gait mechanics, Effusion, and Pain at this time. The pt continues to be a good candidate for skilled PT, in order to further improve Strength, ROM, Flexibility, Motor control, Balance, Gait mechanics, Effusion, and Pain.   Lacking TKE with passive and active motion on R due to sharp lateral knee pain that could use imaging to make sure no bone spur/ mensicus pathology occurring with bony prominence palpated that was TTP at distal ITB insertion.    Plan: TKE, manual, hip and ankle strength, progress [facility] strength and balance    Brady Booth PT, DPT #[id number]

## 2025-05-05 ENCOUNTER — OFFICE VISIT (OUTPATIENT)
Dept: ORTHOPEDIC SURGERY | Facility: CLINIC | Age: 70
End: 2025-05-05
Payer: MEDICARE

## 2025-05-05 ENCOUNTER — HOSPITAL ENCOUNTER (OUTPATIENT)
Dept: RADIOLOGY | Facility: CLINIC | Age: 70
Discharge: HOME | End: 2025-05-05
Payer: MEDICARE

## 2025-05-05 ENCOUNTER — HOSPITAL ENCOUNTER (OUTPATIENT)
Dept: RADIOLOGY | Facility: HOSPITAL | Age: 70
Discharge: HOME | End: 2025-05-05
Payer: MEDICARE

## 2025-05-05 DIAGNOSIS — M23.90 INTERNAL DERANGEMENT OF KNEE, UNSPECIFIED LATERALITY: Primary | ICD-10-CM

## 2025-05-05 DIAGNOSIS — M23.90 INTERNAL DERANGEMENT OF KNEE, UNSPECIFIED LATERALITY: ICD-10-CM

## 2025-05-05 DIAGNOSIS — S82.141S TIBIAL PLATEAU FRACTURE, RIGHT, SEQUELA: ICD-10-CM

## 2025-05-05 DIAGNOSIS — M17.11 PRIMARY OSTEOARTHRITIS OF RIGHT KNEE: ICD-10-CM

## 2025-05-05 PROCEDURE — 73721 MRI JNT OF LWR EXTRE W/O DYE: CPT | Mod: RIGHT SIDE | Performed by: RADIOLOGY

## 2025-05-05 PROCEDURE — 99214 OFFICE O/P EST MOD 30 MIN: CPT | Mod: 25 | Performed by: ORTHOPAEDIC SURGERY

## 2025-05-05 PROCEDURE — 73721 MRI JNT OF LWR EXTRE W/O DYE: CPT | Mod: RT

## 2025-05-05 PROCEDURE — 1159F MED LIST DOCD IN RCRD: CPT | Performed by: ORTHOPAEDIC SURGERY

## 2025-05-05 PROCEDURE — 73562 X-RAY EXAM OF KNEE 3: CPT | Mod: RT

## 2025-05-05 PROCEDURE — 73562 X-RAY EXAM OF KNEE 3: CPT | Mod: RIGHT SIDE | Performed by: RADIOLOGY

## 2025-05-05 PROCEDURE — 1036F TOBACCO NON-USER: CPT | Performed by: ORTHOPAEDIC SURGERY

## 2025-05-05 PROCEDURE — 99214 OFFICE O/P EST MOD 30 MIN: CPT | Performed by: ORTHOPAEDIC SURGERY

## 2025-05-05 NOTE — PROGRESS NOTES
Returns for right knee.  Still working with physical therapist.  Some sharp pain in the front of his knee when getting up from a seated position for long walks is most bothersome at this time.  No real locking or catching in the knee.  Had a remote right knee arthroscopy.    Exam: Near full range of motion right knee.  Tender across anterior joint line with extension.  Stable varus valgus stress.  No crepitus.    I personally reviewed the following radiographic exams: Right knee shows nondisplaced healed split lateral tibial plateau fracture.  Well aligned joint space.  Moderate tricompartmental arthritic disease.  Previous tib-fib films from injury read as normal but in retrospect does show a small nondisplaced split fracture of the lateral plateau.    Assessment: Healed right lateral plateau fracture with underlying arthrosis, possible internal derangement.    Plan: Discussed nonoperative and operative options in detail.   Risk and benefits discussed in detail. All questions answered today.  Recovery timeline and expectations discussed in detail.  Recommend MRI right knee to evaluate the lateral joint line and meniscus.  Discussed possible injection or possible arthroscopy.  No he clearly had a split fracture of the plateau, I think it healed and very reasonable position as he really relied on that leg all recovering from his left foot fractures.  Will contact him after reviewing the MRI determine any other treatment at this time.

## 2025-05-06 ENCOUNTER — APPOINTMENT (OUTPATIENT)
Dept: RADIOLOGY | Facility: HOSPITAL | Age: 70
End: 2025-05-06
Payer: MEDICARE

## 2025-05-07 ENCOUNTER — APPOINTMENT (OUTPATIENT)
Dept: PHYSICAL THERAPY | Facility: CLINIC | Age: 70
End: 2025-05-07
Payer: MEDICARE

## 2025-05-09 ENCOUNTER — HOSPITAL ENCOUNTER (OUTPATIENT)
Dept: RADIOLOGY | Facility: CLINIC | Age: 70
Discharge: HOME | End: 2025-05-09
Payer: MEDICARE

## 2025-05-09 DIAGNOSIS — M17.11 PRIMARY OSTEOARTHRITIS OF RIGHT KNEE: ICD-10-CM

## 2025-05-14 ENCOUNTER — APPOINTMENT (OUTPATIENT)
Dept: PHYSICAL THERAPY | Facility: CLINIC | Age: 70
End: 2025-05-14
Payer: MEDICARE

## 2025-05-22 ENCOUNTER — TREATMENT (OUTPATIENT)
Dept: PHYSICAL THERAPY | Facility: CLINIC | Age: 70
End: 2025-05-22
Payer: MEDICARE

## 2025-05-22 ENCOUNTER — APPOINTMENT (OUTPATIENT)
Dept: DERMATOLOGY | Facility: CLINIC | Age: 70
End: 2025-05-22
Payer: MEDICARE

## 2025-05-22 ENCOUNTER — APPOINTMENT (OUTPATIENT)
Dept: PHYSICAL THERAPY | Facility: CLINIC | Age: 70
End: 2025-05-22
Payer: MEDICARE

## 2025-05-22 DIAGNOSIS — L57.0 ACTINIC KERATOSIS: Primary | ICD-10-CM

## 2025-05-22 DIAGNOSIS — M62.81 MUSCLE WEAKNESS: ICD-10-CM

## 2025-05-22 DIAGNOSIS — T14.8XXA HEMATOMA: ICD-10-CM

## 2025-05-22 DIAGNOSIS — Z85.820 PERSONAL HISTORY OF MALIGNANT MELANOMA OF SKIN: ICD-10-CM

## 2025-05-22 DIAGNOSIS — S92.332D CLOSED DISPLACED FRACTURE OF THIRD METATARSAL BONE OF LEFT FOOT WITH ROUTINE HEALING: ICD-10-CM

## 2025-05-22 DIAGNOSIS — L82.0 INFLAMED SEBORRHEIC KERATOSIS: ICD-10-CM

## 2025-05-22 DIAGNOSIS — Z85.828 PERSONAL HISTORY OF SKIN CANCER: ICD-10-CM

## 2025-05-22 DIAGNOSIS — D22.9 MULTIPLE BENIGN NEVI: ICD-10-CM

## 2025-05-22 DIAGNOSIS — T14.8XXA CONTUSION OF BONE: ICD-10-CM

## 2025-05-22 DIAGNOSIS — L81.4 LENTIGO: ICD-10-CM

## 2025-05-22 DIAGNOSIS — Z86.018 HISTORY OF DYSPLASTIC NEVUS: ICD-10-CM

## 2025-05-22 DIAGNOSIS — L57.8 ACTINIC SKIN DAMAGE: ICD-10-CM

## 2025-05-22 DIAGNOSIS — M17.12 PRIMARY OSTEOARTHRITIS OF LEFT KNEE: ICD-10-CM

## 2025-05-22 DIAGNOSIS — Z12.83 SCREENING EXAM FOR SKIN CANCER: ICD-10-CM

## 2025-05-22 DIAGNOSIS — L82.1 SEBORRHEIC KERATOSIS: ICD-10-CM

## 2025-05-22 DIAGNOSIS — M17.11 PRIMARY OSTEOARTHRITIS OF RIGHT KNEE: ICD-10-CM

## 2025-05-22 PROCEDURE — 97110 THERAPEUTIC EXERCISES: CPT | Mod: GP

## 2025-05-22 ASSESSMENT — DERMATOLOGY PATIENT ASSESSMENT
DO YOU USE A TANNING BED: NO
HAVE YOU HAD OR DO YOU HAVE VASCULAR DISEASE: NO
HAVE YOU HAD OR DO YOU HAVE A STAPH INFECTION: NO
ARE YOU AN ORGAN TRANSPLANT RECIPIENT: NO
DO YOU USE SUNSCREEN: DAILY
DO YOU HAVE ANY NEW OR CHANGING LESIONS: NO

## 2025-05-22 ASSESSMENT — DERMATOLOGY QUALITY OF LIFE (QOL) ASSESSMENT
ARE THERE EXCLUSIONS OR EXCEPTIONS FOR THE QUALITY OF LIFE ASSESSMENT: NO
DATE THE QUALITY-OF-LIFE ASSESSMENT WAS COMPLETED: 67347
RATE HOW BOTHERED YOU ARE BY EFFECTS OF YOUR SKIN PROBLEMS ON YOUR ACTIVITIES (EG, GOING OUT, ACCOMPLISHING WHAT YOU WANT, WORK ACTIVITIES OR YOUR RELATIONSHIPS WITH OTHERS): 0 - NEVER BOTHERED
RATE HOW BOTHERED YOU ARE BY SYMPTOMS OF YOUR SKIN PROBLEM (EG, ITCHING, STINGING BURNING, HURTING OR SKIN IRRITATION): 0 - NEVER BOTHERED
RATE HOW EMOTIONALLY BOTHERED YOU ARE BY YOUR SKIN PROBLEM (FOR EXAMPLE, WORRY, EMBARRASSMENT, FRUSTRATION): 0 - NEVER BOTHERED

## 2025-05-22 ASSESSMENT — PATIENT GLOBAL ASSESSMENT (PGA): PATIENT GLOBAL ASSESSMENT: PATIENT GLOBAL ASSESSMENT:  1 - CLEAR

## 2025-05-22 ASSESSMENT — ITCH NUMERIC RATING SCALE: HOW SEVERE IS YOUR ITCHING?: 0

## 2025-05-22 NOTE — PROGRESS NOTES
Physical Therapy  Physical Therapy Treatment    Patient Name: Zak Brizuela Jr.  MRN: [id number]  Today's Date: 5/22/2025  Time Calculation  Start Time: 0752  Stop Time: 0832  Time Calculation (min): 40 min    Insurance:  Visit [place]: [place]  Authorization info: none  Insurance Type: medicare & MMO medicare  Cert date start: 4/18/25        Cert date end: 7/17/25       General:  Reason for visit: Closed displaced fracture of third metatarsal bone of left foot with routine healing  Primary osteoarthritis of left knee  Primary osteoarthritis of right knee  Referred by: Dr. Calzada               Current Problem  1. Primary osteoarthritis of right knee  Follow Up In Physical Therapy      2. Primary osteoarthritis of left knee  Follow Up In Physical Therapy      3. Closed displaced fracture of third metatarsal bone of left foot with routine healing  Follow Up In Physical Therapy      4. Contusion of bone  Follow Up In Physical Therapy      5. Hematoma  Follow Up In Physical Therapy      6. Muscle weakness  Follow Up In Physical Therapy             Healing fx lateral tibial condyle  Pain = 3/10 in R knee       Subjective:   Patient reports wbing status unrestricted slowly getting better   HEP Performed:  Partially    X-ray on 5/5:  There is a intra-articular, slightly displaced fracture through the   lateral tibial plateau and extending inferiorly. No dislocation is   seen. Degenerative changes are noted. There is a small suprapatellar   effusion.        Intra-articular, slightly displaced lateral tibial plateau fracture.       Degenerative changes.       Small suprapatellar effusion.     Objective:   ANKLE    Functional Rating Scale     Observation     Ankle Palpation/Joint Mobility Assessment     Ankle AROM     Ankle PROM     Ankle MMT  R ankle dorsiflexion: (5/5): 5  L ankle dorsiflexion: (5/5): 4  R ankle plantarflexion: (5/5): 5  L ankle plantarflexion: (5/5): 5  R ankle inversion: (5/5): 5  L ankle inversion: (5/5):  4+ P!  R ankle eversion: (5/5): 5  L ankle eversion: (5/5): 5    SAMSON + bilaterally    Treatments:   Upright bike x 4 min res 6  Objective measures x 5 min  BFR 75% occlusion: 30-15-15-15: R leg  -Quad set  -Phys ball HS curl   Floss band PROM x 4 min ankle x 4 min R knee  Floss band AROM x 20 ankle pumps xc 15 cw/ccw circles L ankle  Banded side steps 2 x6-5-4-3-2-1 ea  Tib bar raises 2 x 15    Not done:  Patellar mobs x 5 min  Tibial distraction x 4min  STM/ MFR quad/ ITB/ HS x 4 min  Banded TKE x 15 - 5 sec blk band  HEP review and edu x 15 min    Charges: TE x3    Access Code: [email or web address]  URL: [email or web address]  Date: 04/18/2025  Prepared by: Brady Booth    Exercises  - Clamshell with Resistance  - 1 x daily - 7 x weekly - 3 sets - 10 reps - 10 hold  - Clamshell with Resistance (Mirrored)  - 1 x daily - 7 x weekly - 3 sets - 10 reps - 10 hold  - Ankle Pumps in Elevation  - 3 x daily - 7 x weekly - 20 reps  - Ankle Circles in Elevation  - 3 x daily - 7 x weekly - 20 reps  - Long Sitting Quad Set with Towel Roll Under Heel  - 2 x daily - 7 x weekly - 3 sets - 10 reps - 6 hold  - Active Straight Leg Raise with Quad Set  - 1 x daily - 7 x weekly - 3 sets - 10 reps  - Sidelying Hip Abduction  - 1 x daily - 7 x weekly - 3 sets - 10 reps - 3-5 hold  - Sidelying Hip Abduction (Mirrored)  - 1 x daily - 7 x weekly - 3 sets - 10 reps - 3-5 hold    Assessment:   Despite presence of fx pt progressing well, just mildly limited in resisted inversion at ankle on L and loading quad in long arc loading so was able to progress with BFR loading to help with tolerable hypertrophy stimulus and promote tissue healing to return to PLOF.    Plan:   Tib and BFR  TKE, manual, hip and ankle strength, progress CKC strength and balance    Brady Booth PT, DPT #[id number]

## 2025-05-27 ENCOUNTER — PATIENT MESSAGE (OUTPATIENT)
Dept: PRIMARY CARE | Facility: CLINIC | Age: 70
End: 2025-05-27
Payer: MEDICARE

## 2025-05-28 ENCOUNTER — APPOINTMENT (OUTPATIENT)
Dept: PHYSICAL THERAPY | Facility: CLINIC | Age: 70
End: 2025-05-28
Payer: MEDICARE

## 2025-05-28 DIAGNOSIS — J45.30 MILD PERSISTENT ASTHMA WITHOUT COMPLICATION (HHS-HCC): ICD-10-CM

## 2025-05-28 RX ORDER — ALBUTEROL SULFATE 90 UG/1
2 INHALANT RESPIRATORY (INHALATION) EVERY 6 HOURS PRN
Qty: 18 G | Refills: 3 | Status: SHIPPED | OUTPATIENT
Start: 2025-05-28

## 2025-06-03 ENCOUNTER — TREATMENT (OUTPATIENT)
Dept: PHYSICAL THERAPY | Facility: CLINIC | Age: 70
End: 2025-06-03
Payer: MEDICARE

## 2025-06-03 DIAGNOSIS — T14.8XXA HEMATOMA: ICD-10-CM

## 2025-06-03 DIAGNOSIS — T14.8XXA CONTUSION OF BONE: ICD-10-CM

## 2025-06-03 DIAGNOSIS — M62.81 MUSCLE WEAKNESS: ICD-10-CM

## 2025-06-03 DIAGNOSIS — S92.332D CLOSED DISPLACED FRACTURE OF THIRD METATARSAL BONE OF LEFT FOOT WITH ROUTINE HEALING: ICD-10-CM

## 2025-06-03 DIAGNOSIS — M17.11 PRIMARY OSTEOARTHRITIS OF RIGHT KNEE: ICD-10-CM

## 2025-06-03 DIAGNOSIS — M17.12 PRIMARY OSTEOARTHRITIS OF LEFT KNEE: ICD-10-CM

## 2025-06-03 PROCEDURE — 97110 THERAPEUTIC EXERCISES: CPT | Mod: GP

## 2025-06-03 PROCEDURE — 97140 MANUAL THERAPY 1/> REGIONS: CPT | Mod: GP

## 2025-06-03 NOTE — PROGRESS NOTES
Physical Therapy  Physical Therapy Treatment    Patient Name: Zak Brizuela Jr.  MRN: [id number]  Today's Date: 6/3/2025       Insurance:  Visit number: 4 of [place]  Authorization info: none  Insurance Type: medicare & MMO medicare  Cert date start: 4/18/25        Cert date end: 7/17/25       General:  Reason for visit: Closed displaced fracture of third metatarsal bone of left foot with routine healing  Primary osteoarthritis of left knee  Primary osteoarthritis of right knee  Referred by: Dr. Calzada               Current Problem  1. Primary osteoarthritis of right knee  Follow Up In Physical Therapy      2. Primary osteoarthritis of left knee  Follow Up In Physical Therapy      3. Closed displaced fracture of third metatarsal bone of left foot with routine healing  Follow Up In Physical Therapy      4. Contusion of bone  Follow Up In Physical Therapy      5. Hematoma  Follow Up In Physical Therapy      6. Muscle weakness  Follow Up In Physical Therapy               Healing fx lateral tibial condyle  Pain = 3/10 in R knee       Subjective:   Patient reports  feeling good has been walking a a lot and doing light swing training with golf club.  HEP Performed:  Partially    MRI 5/5 :  IMPRESSION:  1. Subacute comminuted split type lateral tibial plateau fracture  with partial healing. Continued follow-up recommended with  radiographs. No significant articular surface diastasis.  2. Severe medial and moderate patellofemoral compartment  osteoarthrosis with chondral loss as above.  3. Horizontal tear of the body and posterior horn segments of the  medial meniscus.  4. Chronic tendinosis of the distal quadriceps tendon with associated  enthesophyte, similar to prior.  X-ray on 5/5:  There is a intra-articular, slightly displaced fracture through the   lateral tibial plateau and extending inferiorly. No dislocation is   seen. Degenerative changes are noted. There is a small suprapatellar   effusion.        Intra-articular,  slightly displaced lateral tibial plateau fracture.       Degenerative changes.       Small suprapatellar effusion.     Objective:   ANKLE    Functional Rating Scale     Observation     Ankle Palpation/Joint Mobility Assessment     Ankle AROM     Ankle PROM     Ankle MMT       SAMSON + bilaterally    Treatments:   Upright bike x 4 min res 6  Objective measures x 5 min  Patellar mobs all direction with medial tilt x 3 min  Femur on tib mobs x 4 min  Fib mobs IR and ER x 3 min  Heel prop ext stretch x 3 min with 5 lbs   BFR 80% occlusion: 30-15-15-15: R leg  -SLR/quad set 2 sets ea  -Standing TKE 35# band   - Mini squats bw  Banded inversion blktb x15   Banded eversion blktb x 15    Not done:  -Phys ball HS curl   Floss band PROM x 4 min ankle x 4 min R knee  Floss band AROM x 20 ankle pumps xc 15 cw/ccw circles L ankle  Banded side steps 2 x6-5-4-3-2-1 ea  Tib bar raises 2 x 15    Not done:    Tibial distraction x 4min  STM/ MFR quad/ ITB/ HS x 4 min    HEP review and edu x 15 min    Charges: TE x3    Access Code: [email or web address]  URL: [email or web address]  Date: 04/18/2025  Prepared by: Brady Booth    Exercises  - Clamshell with Resistance  - 1 x daily - 7 x weekly - 3 sets - 10 reps - 10 hold  - Clamshell with Resistance (Mirrored)  - 1 x daily - 7 x weekly - 3 sets - 10 reps - 10 hold  - Ankle Pumps in Elevation  - 3 x daily - 7 x weekly - 20 reps  - Ankle Circles in Elevation  - 3 x daily - 7 x weekly - 20 reps  - Long Sitting Quad Set with Towel Roll Under Heel  - 2 x daily - 7 x weekly - 3 sets - 10 reps - 6 hold  - Active Straight Leg Raise with Quad Set  - 1 x daily - 7 x weekly - 3 sets - 10 reps  - Sidelying Hip Abduction  - 1 x daily - 7 x weekly - 3 sets - 10 reps - 3-5 hold  - Sidelying Hip Abduction (Mirrored)  - 1 x daily - 7 x weekly - 3 sets - 10 reps - 3-5 hold    Assessment:   The focus of the session was Strengthening, ROM, joint mobilizations, Motor Control, and functional  training. The pt demonstrated Good and Improving tolerance to the noted exercises today. The pt is demonstrated Good and Improving progress in skilled rehab at this time. The pt is still limited in overall Strength, ROM, Flexibility, Motor control, Balance, Effusion, and Pain at this time. The pt continues to be a good candidate for skilled PT, in order to further improve Strength, ROM, Flexibility, Motor control, Effusion, and Pain. Pt progressed well and tolerated progression of BFR well but did fatigue on last set of mini squats. Utilizing BFR for unloading joint and increase tissue growth stimulus to help with muscle growth and bone building in order to aid in healing to tib plateau fx.      Plan:   Tib and BFR balance, inv/ev=Lankle  TKE, manual, hip and ankle strength, progress CKC strength and balance    Brady Booth PT, DPT #[id number]

## 2025-06-06 ENCOUNTER — TREATMENT (OUTPATIENT)
Dept: PHYSICAL THERAPY | Facility: CLINIC | Age: 70
End: 2025-06-06
Payer: MEDICARE

## 2025-06-06 DIAGNOSIS — T14.8XXA HEMATOMA: ICD-10-CM

## 2025-06-06 DIAGNOSIS — M62.81 MUSCLE WEAKNESS: ICD-10-CM

## 2025-06-06 DIAGNOSIS — M17.11 PRIMARY OSTEOARTHRITIS OF RIGHT KNEE: ICD-10-CM

## 2025-06-06 DIAGNOSIS — S92.332D CLOSED DISPLACED FRACTURE OF THIRD METATARSAL BONE OF LEFT FOOT WITH ROUTINE HEALING: ICD-10-CM

## 2025-06-06 DIAGNOSIS — M17.12 PRIMARY OSTEOARTHRITIS OF LEFT KNEE: ICD-10-CM

## 2025-06-06 DIAGNOSIS — T14.8XXA CONTUSION OF BONE: ICD-10-CM

## 2025-06-06 PROCEDURE — 97112 NEUROMUSCULAR REEDUCATION: CPT | Mod: GP

## 2025-06-06 PROCEDURE — 97110 THERAPEUTIC EXERCISES: CPT | Mod: GP

## 2025-06-06 NOTE — PROGRESS NOTES
Physical Therapy  Physical Therapy Treatment    Patient Name: Zak Brizuela Jr.  MRN: [id number]  Today's Date: 6/6/2025  Time Calculation  Start Time: 0916  Stop Time: 1002  Time Calculation (min): 46 min    Insurance:  Visit number: 5 of [place]  Authorization info: none  Insurance Type: medicare & MMO medicare  Cert date start: 4/18/25        Cert date end: 7/17/25       General:  Reason for visit: Closed displaced fracture of third metatarsal bone of left foot with routine healing  Primary osteoarthritis of left knee  Primary osteoarthritis of right knee  Referred by: Dr. Calzada               Current Problem  1. Primary osteoarthritis of right knee  Follow Up In Physical Therapy      2. Primary osteoarthritis of left knee  Follow Up In Physical Therapy      3. Closed displaced fracture of third metatarsal bone of left foot with routine healing  Follow Up In Physical Therapy      4. Contusion of bone  Follow Up In Physical Therapy      5. Hematoma  Follow Up In Physical Therapy      6. Muscle weakness  Follow Up In Physical Therapy                Precautions  Precautions Comment: healing fx lateral tibial condyle & medial meniscus tearHealing fx lateral tibial condyle  Pain = 2/10 in R knee       Subjective:   Patient reports  still a little sore from last time use of BFR so may have been on the cusp of too much.  HEP Performed:  yes    MRI 5/5 :  IMPRESSION:  1. Subacute comminuted split type lateral tibial plateau fracture  with partial healing. Continued follow-up recommended with  radiographs. No significant articular surface diastasis.  2. Severe medial and moderate patellofemoral compartment  osteoarthrosis with chondral loss as above.  3. Horizontal tear of the body and posterior horn segments of the  medial meniscus.  4. Chronic tendinosis of the distal quadriceps tendon with associated  enthesophyte, similar to prior.  X-ray on 5/5:  There is a intra-articular, slightly displaced fracture through the    lateral tibial plateau and extending inferiorly. No dislocation is   seen. Degenerative changes are noted. There is a small suprapatellar   effusion.        Intra-articular, slightly displaced lateral tibial plateau fracture.       Degenerative changes.       Small suprapatellar effusion.     Objective:   ANKLE    Functional Rating Scale     Observation     Ankle Palpation/Joint Mobility Assessment     Ankle AROM     Ankle PROM     Ankle MMT       SAMSON + bilaterally\      Decreased SLS on R    Treatments:   Upright bike x 4 min res 6  Banded TKE 2 x15 - 5 sec hold  Banded side step BTB 2 x 6-5-4-3-2-1  Seated HS curl 65 lbs 3 x15  Reactive rebounder toss and catch x 15 ea tandem  Reactive rebounder toss and catch x 15 ea SLS L & R  SLS golf back swing 2 x 10 L & R  SLS golf full swing 2 x 5 ea - multiple attempts for success  Cable anti rot long bar walkouts x 6 down and back L & R 10 lbs  Slant board post tib raise x 20  Calf strech x 30 sec ea  HS stretch x 30 sec ea    Not done:  Objective measures x 5 min  Patellar mobs all direction with medial tilt x 3 min  Femur on tib mobs x 4 min  Fib mobs IR and ER x 3 min  Heel prop ext stretch x 3 min with 5 lbs   BFR 80% occlusion: 30-15-15-15: R leg  -SLR/quad set 2 sets ea  -Standing TKE 35# band   - Mini squats bw  Banded inversion blktb x15   Banded eversion blktb x 15    Not done:  -Phys ball HS curl   Floss band PROM x 4 min ankle x 4 min R knee  Floss band AROM x 20 ankle pumps xc 15 cw/ccw circles L ankle  Banded side steps 2 x6-5-4-3-2-1 ea  Tib bar raises 2 x 15    Not done:    Tibial distraction x 4min  STM/ MFR quad/ ITB/ HS x 4 min    HEP review and edu x 15 min    Charges: TE x3    Access Code: [email or web address]  URL: [email or web address]  Date: 04/18/2025  Prepared by: Brady Booth    Exercises  - Clamshell with Resistance  - 1 x daily - 7 x weekly - 3 sets - 10 reps - 10 hold  - Clamshell with Resistance (Mirrored)  - 1 x daily - 7  x weekly - 3 sets - 10 reps - 10 hold  - Ankle Pumps in Elevation  - 3 x daily - 7 x weekly - 20 reps  - Ankle Circles in Elevation  - 3 x daily - 7 x weekly - 20 reps  - Long Sitting Quad Set with Towel Roll Under Heel  - 2 x daily - 7 x weekly - 3 sets - 10 reps - 6 hold  - Active Straight Leg Raise with Quad Set  - 1 x daily - 7 x weekly - 3 sets - 10 reps  - Sidelying Hip Abduction  - 1 x daily - 7 x weekly - 3 sets - 10 reps - 3-5 hold  - Sidelying Hip Abduction (Mirrored)  - 1 x daily - 7 x weekly - 3 sets - 10 reps - 3-5 hold    Assessment:   Pt progressing well, showing imrpvoed strength and single leg stability to progress towards return to rec / sport activity but still healing in R knee limited full motion and strength at this time.      Plan:   Tib and BFR balance, inv/ev=Lankle  TKE, manual, hip and ankle strength, progress CKC strength and balance    Brady Booth PT, DPT #[id number]

## 2025-06-09 DIAGNOSIS — I10 ESSENTIAL HYPERTENSION: ICD-10-CM

## 2025-06-09 DIAGNOSIS — E55.9 VITAMIN D DEFICIENCY: ICD-10-CM

## 2025-06-10 ENCOUNTER — TREATMENT (OUTPATIENT)
Dept: PHYSICAL THERAPY | Facility: CLINIC | Age: 70
End: 2025-06-10
Payer: MEDICARE

## 2025-06-10 DIAGNOSIS — T14.8XXA HEMATOMA: ICD-10-CM

## 2025-06-10 DIAGNOSIS — M17.11 PRIMARY OSTEOARTHRITIS OF RIGHT KNEE: ICD-10-CM

## 2025-06-10 DIAGNOSIS — M62.81 MUSCLE WEAKNESS: ICD-10-CM

## 2025-06-10 DIAGNOSIS — S92.332D CLOSED DISPLACED FRACTURE OF THIRD METATARSAL BONE OF LEFT FOOT WITH ROUTINE HEALING: ICD-10-CM

## 2025-06-10 DIAGNOSIS — T14.8XXA CONTUSION OF BONE: ICD-10-CM

## 2025-06-10 DIAGNOSIS — M17.12 PRIMARY OSTEOARTHRITIS OF LEFT KNEE: ICD-10-CM

## 2025-06-10 PROCEDURE — 97110 THERAPEUTIC EXERCISES: CPT | Mod: GP

## 2025-06-10 PROCEDURE — 97112 NEUROMUSCULAR REEDUCATION: CPT | Mod: GP

## 2025-06-10 NOTE — PROGRESS NOTES
Physical Therapy  Physical Therapy Treatment    Patient Name: Zak Brizuela Jr.  MRN: [id number]  Today's Date: 6/10/2025  Time Calculation  Start Time: 1134  Stop Time: 1220  Time Calculation (min): 46 min    Insurance:  Visit number: 6 of [place]  Authorization info: none  Insurance Type: medicare & MMO medicare  Cert date start: 4/18/25        Cert date end: 7/17/25       General:  Reason for visit: Closed displaced fracture of third metatarsal bone of left foot with routine healing  Primary osteoarthritis of left knee  Primary osteoarthritis of right knee  Referred by: Dr. Calzada               Current Problem  1. Primary osteoarthritis of right knee  Follow Up In Physical Therapy      2. Primary osteoarthritis of left knee  Follow Up In Physical Therapy      3. Closed displaced fracture of third metatarsal bone of left foot with routine healing  Follow Up In Physical Therapy      4. Contusion of bone  Follow Up In Physical Therapy      5. Hematoma  Follow Up In Physical Therapy      6. Muscle weakness  Follow Up In Physical Therapy                   Healing fx lateral tibial condyle  Pain = 3/10 in R knee       Subjective:   Patient reports a little late to appointment due to court date for so apologizes. Recovery was good after last session. However was kneeling in garden for 8 hours on Saturday.  HEP Performed:  yes    MRI 5/5 :  IMPRESSION:  1. Subacute comminuted split type lateral tibial plateau fracture  with partial healing. Continued follow-up recommended with  radiographs. No significant articular surface diastasis.  2. Severe medial and moderate patellofemoral compartment  osteoarthrosis with chondral loss as above.  3. Horizontal tear of the body and posterior horn segments of the  medial meniscus.  4. Chronic tendinosis of the distal quadriceps tendon with associated  enthesophyte, similar to prior.  X-ray on 5/5:  There is a intra-articular, slightly displaced fracture through the   lateral tibial  plateau and extending inferiorly. No dislocation is   seen. Degenerative changes are noted. There is a small suprapatellar   effusion.        Intra-articular, slightly displaced lateral tibial plateau fracture.       Degenerative changes.       Small suprapatellar effusion.     Objective:   ANKLE    Functional Rating Scale     Observation     Ankle Palpation/Joint Mobility Assessment     Ankle AROM     Ankle PROM     Ankle MMT       SAMSON + bilaterally      Decreased SLS on R- especially with medial pertubation    Treatments:   elliptical x 4 min res 6  SL bal:  -SLS hip abd 2 x 10 - 3 sec hold L & R BTB  -SLS firehydrant BTB R x10 - 3 sec   Cable pronation control 8-4 R/2-7L x15 ea split stance 10 lbs/x15 ea RESS stance 5lbs  RNT kick stand quarter squat x6 - 10 sec hold org  SL heel touchdown with hip hinge cue  2 x 10 ea 3 sec pause in hinge  HEP review and edu x 4 min    Charges: TE x1 NMR x2    Access Code: [email or web address]  URL: [email or web address]  Date: 04/18/2025  Prepared by: Brady Booth    Exercises  - Clamshell with Resistance  - 1 x daily - 7 x weekly - 3 sets - 10 reps - 10 hold  - Clamshell with Resistance (Mirrored)  - 1 x daily - 7 x weekly - 3 sets - 10 reps - 10 hold  - Ankle Pumps in Elevation  - 3 x daily - 7 x weekly - 20 reps  - Ankle Circles in Elevation  - 3 x daily - 7 x weekly - 20 reps  - Long Sitting Quad Set with Towel Roll Under Heel  - 2 x daily - 7 x weekly - 3 sets - 10 reps - 6 hold  - Active Straight Leg Raise with Quad Set  - 1 x daily - 7 x weekly - 3 sets - 10 reps  - Sidelying Hip Abduction  - 1 x daily - 7 x weekly - 3 sets - 10 reps - 3-5 hold  - Sidelying Hip Abduction (Mirrored)  - 1 x daily - 7 x weekly - 3 sets - 10 reps - 3-5 hold    Assessment:   Pt progressing well, today's session focused on SLS perturbations working on controlling probation/valgus/medial collapse of kinetic chain , particular on R as well as hip hinge and posterior chain  loading to decrease stress on R knee and progress towards return to pain free rec/ sport activity.      Plan: SLS glute med  Tib and BFR balance, inv/ev=Lankle  TKE, manual, hip and ankle strength, progress CKC strength and balance    Brady Booth PT, DPT #[id number]

## 2025-06-13 ENCOUNTER — APPOINTMENT (OUTPATIENT)
Dept: PHYSICAL THERAPY | Facility: CLINIC | Age: 70
End: 2025-06-13
Payer: MEDICARE

## 2025-06-17 ENCOUNTER — TREATMENT (OUTPATIENT)
Dept: PHYSICAL THERAPY | Facility: CLINIC | Age: 70
End: 2025-06-17
Payer: MEDICARE

## 2025-06-17 DIAGNOSIS — M17.11 PRIMARY OSTEOARTHRITIS OF RIGHT KNEE: ICD-10-CM

## 2025-06-17 DIAGNOSIS — M17.12 PRIMARY OSTEOARTHRITIS OF LEFT KNEE: ICD-10-CM

## 2025-06-17 DIAGNOSIS — M62.81 MUSCLE WEAKNESS: ICD-10-CM

## 2025-06-17 DIAGNOSIS — T14.8XXA HEMATOMA: ICD-10-CM

## 2025-06-17 DIAGNOSIS — T14.8XXA CONTUSION OF BONE: ICD-10-CM

## 2025-06-17 DIAGNOSIS — S92.332D CLOSED DISPLACED FRACTURE OF THIRD METATARSAL BONE OF LEFT FOOT WITH ROUTINE HEALING: ICD-10-CM

## 2025-06-17 PROCEDURE — 97110 THERAPEUTIC EXERCISES: CPT | Mod: GP

## 2025-06-17 ASSESSMENT — PAIN SCALES - GENERAL: PAINLEVEL_OUTOF10: 3

## 2025-06-17 ASSESSMENT — PAIN - FUNCTIONAL ASSESSMENT: PAIN_FUNCTIONAL_ASSESSMENT: 0-10

## 2025-06-17 NOTE — PROGRESS NOTES
Physical Therapy  Physical Therapy Treatment    Patient Name: Zak Brizuela Jr.  MRN: [id number]  Today's Date: 6/17/2025  Time Calculation  Start Time: 0923  Stop Time: 1003  Time Calculation (min): 40 min    Insurance:  Visit number: 7 of [place]  Authorization info: none  Insurance Type: medicare & MMO medicare  Cert date start: 4/18/25        Cert date end: 7/17/25       General:  Reason for visit: Closed displaced fracture of third metatarsal bone of left foot with routine healing  Primary osteoarthritis of left knee  Primary osteoarthritis of right knee  Referred by: Dr. Calzada               Current Problem  1. Primary osteoarthritis of right knee  Follow Up In Physical Therapy      2. Primary osteoarthritis of left knee  Follow Up In Physical Therapy      3. Closed displaced fracture of third metatarsal bone of left foot with routine healing  Follow Up In Physical Therapy      4. Contusion of bone  Follow Up In Physical Therapy      5. Hematoma  Follow Up In Physical Therapy      6. Muscle weakness  Follow Up In Physical Therapy                    Precautions  Precautions Comment: healing fx lateral tibial condyle & medial meniscus tear  Pain Assessment: 0-10  0-10 (Numeric) Pain Score: 3  Pain Location: Knee  Pain Orientation: Right    Subjective:   Patient reports played 18 holes of golf yesterday, did not notice any pain during but is now feeling sore in L hip and R knee.  HEP Performed:  yes    MRI 5/5 :  IMPRESSION:  1. Subacute comminuted split type lateral tibial plateau fracture  with partial healing. Continued follow-up recommended with  radiographs. No significant articular surface diastasis.  2. Severe medial and moderate patellofemoral compartment  osteoarthrosis with chondral loss as above.  3. Horizontal tear of the body and posterior horn segments of the  medial meniscus.  4. Chronic tendinosis of the distal quadriceps tendon with associated  enthesophyte, similar to prior.  X-ray on 5/5:  There is  a intra-articular, slightly displaced fracture through the   lateral tibial plateau and extending inferiorly. No dislocation is   seen. Degenerative changes are noted. There is a small suprapatellar   effusion.        Intra-articular, slightly displaced lateral tibial plateau fracture.       Degenerative changes.       Small suprapatellar effusion.     Objective:   ANKLE    Functional Rating Scale     Observation     Ankle Palpation/Joint Mobility Assessment   1cm effusion L ankle  Ankle AROM     Ankle PROM     Ankle MMT       SAMSON + bilaterally      Decreased SLS on R- especially with medial pertubation    Treatments:   elliptical x 4 min res 4 stride 570  Objective measures  Floss band AROM in eleavtion L ankle:  -pumps x 20   -circles x 15 cw/ccw  BFR: 80 % occlusion 30-15-15-15 reps  -phys ball HS curl  -prone TKE  Adductor stretch 2 x 30 sec ea L&R  KB wt shift 2 x 5 - 5 sec ea L & R  Quadruped adductor sit back stretch 2 x 5 ea   HEP review and edu x 4 min    Not done:  SL bal:  -SLS hip abd 2 x 10 - 3 sec hold L & R BTB  -SLS firehydrant BTB R x10 - 3 sec   Cable pronation control 8-4 R/2-7L x15 ea split stance 10 lbs/x15 ea RESS stance 5lbs  RNT kick stand quarter squat x6 - 10 sec hold org  SL heel touchdown with hip hinge cue  2 x 10 ea 3 sec pause in hinge      Charges: TE x3    Access Code: [email or web address]  URL: [email or web address]  Date: 04/18/2025  Prepared by: Brady Booth    Exercises  - Clamshell with Resistance  - 1 x daily - 7 x weekly - 3 sets - 10 reps - 10 hold  - Clamshell with Resistance (Mirrored)  - 1 x daily - 7 x weekly - 3 sets - 10 reps - 10 hold  - Ankle Pumps in Elevation  - 3 x daily - 7 x weekly - 20 reps  - Ankle Circles in Elevation  - 3 x daily - 7 x weekly - 20 reps  - Long Sitting Quad Set with Towel Roll Under Heel  - 2 x daily - 7 x weekly - 3 sets - 10 reps - 6 hold  - Active Straight Leg Raise with Quad Set  - 1 x daily - 7 x weekly - 3 sets - 10  reps  - Sidelying Hip Abduction  - 1 x daily - 7 x weekly - 3 sets - 10 reps - 3-5 hold  - Sidelying Hip Abduction (Mirrored)  - 1 x daily - 7 x weekly - 3 sets - 10 reps - 3-5 hold    Assessment:   Pt came in with increased pain after golf and some increase effusion in L ankle. After floss band compression mobility effusion was better. Then used BFR to increased hypertrophy stimulus and increase blood flow to injured and re-aggravated R knee in which pt felt decreased pain with walking after BFR.      Plan: SLS glute med  Tib and BFR balance, inv/ev=Lankle  TKE, manual, hip and ankle strength, progress CKC strength and balance    Brady Booth PT, DPT #[id number]

## 2025-06-20 ENCOUNTER — APPOINTMENT (OUTPATIENT)
Dept: PHYSICAL THERAPY | Facility: CLINIC | Age: 70
End: 2025-06-20
Payer: MEDICARE

## 2025-06-20 NOTE — PROGRESS NOTES
Physical Therapy  Physical Therapy Treatment    Patient Name: Zak Brizuela Jr.  MRN: [id number]  Today's Date: 6/20/2025       Insurance:  Visit [place]: [place]  Authorization info: none  Insurance Type: medicare & MMO medicare  Cert date start: 4/18/25        Cert date end: 7/17/25       General:  Reason for visit: Closed displaced fracture of third metatarsal bone of left foot with routine healing  Primary osteoarthritis of left knee  Primary osteoarthritis of right knee  Referred by: Dr. Calzada               Current Problem  No diagnosis found.                        Subjective:   Patient reports   HEP Performed:  yes    MRI 5/5 :  IMPRESSION:  1. Subacute comminuted split type lateral tibial plateau fracture  with partial healing. Continued follow-up recommended with  radiographs. No significant articular surface diastasis.  2. Severe medial and moderate patellofemoral compartment  osteoarthrosis with chondral loss as above.  3. Horizontal tear of the body and posterior horn segments of the  medial meniscus.  4. Chronic tendinosis of the distal quadriceps tendon with associated  enthesophyte, similar to prior.  X-ray on 5/5:  There is a intra-articular, slightly displaced fracture through the   lateral tibial plateau and extending inferiorly. No dislocation is   seen. Degenerative changes are noted. There is a small suprapatellar   effusion.        Intra-articular, slightly displaced lateral tibial plateau fracture.       Degenerative changes.       Small suprapatellar effusion.     Objective:   ANKLE    Functional Rating Scale     Observation     Ankle Palpation/Joint Mobility Assessment   1cm effusion L ankle  Ankle AROM     Ankle PROM     Ankle MMT       SAMSON + bilaterally      Decreased SLS on R- especially with medial pertubation    Treatments:   elliptical x 4 min res 4 stride 570  Objective measures  Hip mobility   Hip strength  CKC  Balance  BFR    Floss band AROM in eleavtion L ankle:  -pumps x 20    -circles x 15 cw/ccw  BFR: 80 % occlusion 30-15-15-15 reps  -phys ball HS curl  -prone TKE  Adductor stretch 2 x 30 sec ea L&R  KB wt shift 2 x 5 - 5 sec ea L & R  Quadruped adductor sit back stretch 2 x 5 ea   HEP review and edu x 4 min    Not done:  SL bal:  -SLS hip abd 2 x 10 - 3 sec hold L & R BTB  -SLS firehydrant BTB R x10 - 3 sec   Cable pronation control 8-4 R/2-7L x15 ea split stance 10 lbs/x15 ea RESS stance 5lbs  RNT kick stand quarter squat x6 - 10 sec hold org  SL heel touchdown with hip hinge cue  2 x 10 ea 3 sec pause in hinge      Charges: TE x3    Access Code: [email or web address]  URL: [email or web address]  Date: 04/18/2025  Prepared by: Brady Booth    Exercises  - Clamshell with Resistance  - 1 x daily - 7 x weekly - 3 sets - 10 reps - 10 hold  - Clamshell with Resistance (Mirrored)  - 1 x daily - 7 x weekly - 3 sets - 10 reps - 10 hold  - Ankle Pumps in Elevation  - 3 x daily - 7 x weekly - 20 reps  - Ankle Circles in Elevation  - 3 x daily - 7 x weekly - 20 reps  - Long Sitting Quad Set with Towel Roll Under Heel  - 2 x daily - 7 x weekly - 3 sets - 10 reps - 6 hold  - Active Straight Leg Raise with Quad Set  - 1 x daily - 7 x weekly - 3 sets - 10 reps  - Sidelying Hip Abduction  - 1 x daily - 7 x weekly - 3 sets - 10 reps - 3-5 hold  - Sidelying Hip Abduction (Mirrored)  - 1 x daily - 7 x weekly - 3 sets - 10 reps - 3-5 hold    Assessment:   Pt came in with increased pain after golf and some increase effusion in L ankle. After floss band compression mobility effusion was better. Then used BFR to increased hypertrophy stimulus and increase blood flow to injured and re-aggravated R knee in which pt felt decreased pain with walking after BFR.      Plan: SLS glute med  Tib and BFR balance, inv/ev=Lankle  TKE, manual, hip and ankle strength, progress CKC strength and balance    Brady Booth PT, DPT #[id number]

## 2025-06-24 ENCOUNTER — APPOINTMENT (OUTPATIENT)
Dept: PHYSICAL THERAPY | Facility: CLINIC | Age: 70
End: 2025-06-24
Payer: MEDICARE

## 2025-06-24 NOTE — PROGRESS NOTES
Physical Therapy  Physical Therapy Treatment    Patient Name: Zak Brizuela Jr.  MRN: [id number]  Today's Date: 6/24/2025       Insurance:  Visit number: [place]  Authorization info: none  Insurance Type: medicare & M[employer] medicare  Cert date start: 4/18/25        Cert date end: 7/17/25       General:  Reason for visit: Closed displaced fracture of third metatarsal bone of left foot with routine healing  Primary osteoarthritis of left knee  Primary osteoarthritis of right knee  Referred by: Dr. Calzada               Current Problem  No diagnosis found.                        Subjective:   Patient reports   HEP Performed:  yes    MRI 5/5 :  IMPRESSION:  1. Subacute comminuted split type lateral tibial plateau fracture  with partial healing. Continued follow-up recommended with  radiographs. No significant articular surface diastasis.  2. Severe medial and moderate patellofemoral compartment  osteoarthrosis with chondral loss as above.  3. Horizontal tear of the body and posterior horn segments of the  medial meniscus.  4. Chronic tendinosis of the distal quadriceps tendon with associated  enthesophyte, similar to prior.  X-ray on 5/5:  There is a intra-articular, slightly displaced fracture through the   lateral tibial plateau and extending inferiorly. No dislocation is   seen. Degenerative changes are noted. There is a small suprapatellar   effusion.        Intra-articular, slightly displaced lateral tibial plateau fracture.       Degenerative changes.       Small suprapatellar effusion.     Objective:   ANKLE    Functional Rating Scale     Observation     Ankle Palpation/Joint Mobility Assessment   1cm effusion L ankle  Ankle AROM     Ankle PROM     Ankle MMT       SAMSON + bilaterally      Decreased SLS on R- especially with medial pertubation    Treatments:   elliptical x 4 min res 4 stride 570  Objective measures  Hip mobility   Hip strength  CKC  Balance  BFR    Floss band AROM in eleavtion L ankle:  -pumps x 20    -circles x 15 cw/ccw  BFR: 80 % occlusion 30-15-15-15 reps  -phys ball HS curl  -prone TKE  Adductor stretch 2 x 30 sec ea L&R  KB wt shift 2 x 5 - 5 sec ea L & R  Quadruped adductor sit back stretch 2 x 5 ea   HEP review and edu x 4 min    Not done:  SL bal:  -SLS hip abd 2 x 10 - 3 sec hold L & R BTB  -SLS firehydrant BTB R x10 - 3 sec   Cable pronation control 8-4 R/2-7L x15 ea split stance 10 lbs/x15 ea RESS stance 5lbs  RNT kick stand quarter squat x6 - 10 sec hold org  SL heel touchdown with hip hinge cue  2 x 10 ea 3 sec pause in hinge      Charges: TE x3    Access Code: [email or web address]  URL: [email or web address]  Date: 04/18/2025  Prepared by: Brady Booth    Exercises  - Clamshell with Resistance  - 1 x daily - 7 x weekly - 3 sets - 10 reps - 10 hold  - Clamshell with Resistance (Mirrored)  - 1 x daily - 7 x weekly - 3 sets - 10 reps - 10 hold  - Ankle Pumps in Elevation  - 3 x daily - 7 x weekly - 20 reps  - Ankle Circles in Elevation  - 3 x daily - 7 x weekly - 20 reps  - Long Sitting Quad Set with Towel Roll Under Heel  - 2 x daily - 7 x weekly - 3 sets - 10 reps - 6 hold  - Active Straight Leg Raise with Quad Set  - 1 x daily - 7 x weekly - 3 sets - 10 reps  - Sidelying Hip Abduction  - 1 x daily - 7 x weekly - 3 sets - 10 reps - 3-5 hold  - Sidelying Hip Abduction (Mirrored)  - 1 x daily - 7 x weekly - 3 sets - 10 reps - 3-5 hold    Assessment:   Pt came in with increased pain after golf and some increase effusion in L ankle. After floss band compression mobility effusion was better. Then used BFR to increased hypertrophy stimulus and increase blood flow to injured and re-aggravated R knee in which pt felt decreased pain with walking after BFR.      Plan: SLS glute med  Tib and BFR balance, inv/ev=Lankle  TKE, manual, hip and ankle strength, progress CKC strength and balance    Brady Booth PT, DPT #[id number]

## 2025-06-27 ENCOUNTER — APPOINTMENT (OUTPATIENT)
Dept: PHYSICAL THERAPY | Facility: CLINIC | Age: 70
End: 2025-06-27
Payer: MEDICARE

## 2025-06-27 NOTE — PROGRESS NOTES
Physical Therapy  Physical Therapy Treatment    Patient Name: Zak Brizuela Jr.  MRN: [id number]  Today's Date: 6/27/2025       Insurance:  Visit number: 8 of [place]  Authorization info: none  Insurance Type: medicare & MMO medicare  Cert date start: 4/18/25        Cert date end: 7/17/25       General:  Reason for visit: Closed displaced fracture of third metatarsal bone of left foot with routine healing  Primary osteoarthritis of left knee  Primary osteoarthritis of right knee  Referred by: Dr. Calzada               Current Problem  No diagnosis found.                        Subjective:   Patient reports   HEP Performed:  yes    MRI 5/5 :  IMPRESSION:  1. Subacute comminuted split type lateral tibial plateau fracture  with partial healing. Continued follow-up recommended with  radiographs. No significant articular surface diastasis.  2. Severe medial and moderate patellofemoral compartment  osteoarthrosis with chondral loss as above.  3. Horizontal tear of the body and posterior horn segments of the  medial meniscus.  4. Chronic tendinosis of the distal quadriceps tendon with associated  enthesophyte, similar to prior.  X-ray on 5/5:  There is a intra-articular, slightly displaced fracture through the   lateral tibial plateau and extending inferiorly. No dislocation is   seen. Degenerative changes are noted. There is a small suprapatellar   effusion.        Intra-articular, slightly displaced lateral tibial plateau fracture.       Degenerative changes.       Small suprapatellar effusion.     Objective:   ANKLE    Functional Rating Scale     Observation     Ankle Palpation/Joint Mobility Assessment   1cm effusion L ankle  Ankle AROM     Ankle PROM     Ankle MMT       SAMSON + bilaterally      Decreased SLS on R- especially with medial pertubation    Treatments:   elliptical x 4 min res 4 stride 570  Objective measures  Hip mobility   Hip strength  CKC  Balance  BFR    Floss band AROM in eleavtion L ankle:  -pumps x 20    -circles x 15 cw/ccw  BFR: 80 % occlusion 30-15-15-15 reps  -phys ball HS curl  -prone TKE  Adductor stretch 2 x 30 sec ea L&R  KB wt shift 2 x 5 - 5 sec ea L & R  Quadruped adductor sit back stretch 2 x 5 ea   HEP review and edu x 4 min    Not done:  SL bal:  -SLS hip abd 2 x 10 - 3 sec hold L & R BTB  -SLS firehydrant BTB R x10 - 3 sec   Cable pronation control 8-4 R/2-7L x15 ea split stance 10 lbs/x15 ea RESS stance 5lbs  RNT kick stand quarter squat x6 - 10 sec hold org  SL heel touchdown with hip hinge cue  2 x 10 ea 3 sec pause in hinge      Charges: TE x3    Access Code: [email or web address]  URL: [email or web address]  Date: 04/18/2025  Prepared by: Brady Booth    Exercises  - Clamshell with Resistance  - 1 x daily - 7 x weekly - 3 sets - 10 reps - 10 hold  - Clamshell with Resistance (Mirrored)  - 1 x daily - 7 x weekly - 3 sets - 10 reps - 10 hold  - Ankle Pumps in Elevation  - 3 x daily - 7 x weekly - 20 reps  - Ankle Circles in Elevation  - 3 x daily - 7 x weekly - 20 reps  - Long Sitting Quad Set with Towel Roll Under Heel  - 2 x daily - 7 x weekly - 3 sets - 10 reps - 6 hold  - Active Straight Leg Raise with Quad Set  - 1 x daily - 7 x weekly - 3 sets - 10 reps  - Sidelying Hip Abduction  - 1 x daily - 7 x weekly - 3 sets - 10 reps - 3-5 hold  - Sidelying Hip Abduction (Mirrored)  - 1 x daily - 7 x weekly - 3 sets - 10 reps - 3-5 hold    Assessment:   Pt came in with increased pain after golf and some increase effusion in L ankle. After floss band compression mobility effusion was better. Then used BFR to increased hypertrophy stimulus and increase blood flow to injured and re-aggravated R knee in which pt felt decreased pain with walking after BFR.      Plan: SLS glute med  Tib and BFR balance, inv/ev=Lankle  TKE, manual, hip and ankle strength, progress CKC strength and balance    Brady Booth PT, DPT #[id number]

## 2025-07-01 ENCOUNTER — DOCUMENTATION (OUTPATIENT)
Dept: PHYSICAL THERAPY | Facility: CLINIC | Age: 70
End: 2025-07-01
Payer: MEDICARE

## 2025-07-01 NOTE — PROGRESS NOTES
Physical Therapy                 Therapy Communication Note    Patient Name: Zak Brizuela Jr.  MRN: [id number]  Department:   Room: Room/bed info not found  Today's Date: 7/1/2025     Discipline: Physical Therapy    Missed Visit:       Missed Visit Reason:      Missed Time: No Show    Comment: 7 cancels/ no shows is being discharged per facility policy      Physical Therapy  Discharge Summary    Referral/Discharge Info:  Date of Discharge: 7/1/25  Date of Last Visit: 6/17/25  Date of Evaluation: 4/18/25  Number of Visits Attended: 7  Referred by: Dr. Calzada  Referred for: Closed displaced fracture of third metatarsal bone of left foot with routine healing  Primary osteoarthritis of left knee  Primary osteoarthritis of right knee    Problems/Issues Addressed:  Bilateral LE weakness and stiffness: hips/knees/ankles      Status at Discharge:  Unable to assess    Reason for Discharge:  Poor attendance         Brady Booth, PT

## 2025-07-11 ENCOUNTER — APPOINTMENT (OUTPATIENT)
Dept: PHYSICAL THERAPY | Facility: CLINIC | Age: 70
End: 2025-07-11
Payer: MEDICARE

## 2025-07-17 ENCOUNTER — PATIENT MESSAGE (OUTPATIENT)
Dept: PRIMARY CARE | Facility: CLINIC | Age: 70
End: 2025-07-17

## 2025-07-17 ENCOUNTER — TELEPHONE (OUTPATIENT)
Dept: PRIMARY CARE | Facility: CLINIC | Age: 70
End: 2025-07-17

## 2025-07-17 ENCOUNTER — LAB (OUTPATIENT)
Dept: LAB | Facility: HOSPITAL | Age: 70
End: 2025-07-17
Payer: MEDICARE

## 2025-07-17 DIAGNOSIS — Z00.00 ENCOUNTER FOR GENERAL ADULT MEDICAL EXAMINATION WITHOUT ABNORMAL FINDINGS: Primary | ICD-10-CM

## 2025-07-17 DIAGNOSIS — C61 PROSTATE CANCER (MULTI): Primary | ICD-10-CM

## 2025-07-17 LAB
25(OH)D3 SERPL-MCNC: 40 NG/ML (ref 30–100)
ANION GAP SERPL CALC-SCNC: 14 MMOL/L (ref 10–20)
BASOPHILS # BLD AUTO: 0.03 X10*3/UL (ref 0–0.1)
BASOPHILS NFR BLD AUTO: 0.8 %
BUN SERPL-MCNC: 13 MG/DL (ref 6–23)
CALCIUM SERPL-MCNC: 9 MG/DL (ref 8.6–10.3)
CHLORIDE SERPL-SCNC: 108 MMOL/L (ref 98–107)
CO2 SERPL-SCNC: 22 MMOL/L (ref 21–32)
CREAT SERPL-MCNC: 0.75 MG/DL (ref 0.5–1.3)
EGFRCR SERPLBLD CKD-EPI 2021: >90 ML/MIN/1.73M*2
EOSINOPHIL # BLD AUTO: 0.13 X10*3/UL (ref 0–0.7)
EOSINOPHIL NFR BLD AUTO: 3.5 %
ERYTHROCYTE [DISTWIDTH] IN BLOOD BY AUTOMATED COUNT: 12.9 % (ref 11.5–14.5)
GLUCOSE SERPL-MCNC: 78 MG/DL (ref 74–99)
HCT VFR BLD AUTO: 42.6 % (ref 41–52)
HGB BLD-MCNC: 14.8 G/DL (ref 13.5–17.5)
IMM GRANULOCYTES # BLD AUTO: 0 X10*3/UL (ref 0–0.7)
IMM GRANULOCYTES NFR BLD AUTO: 0 % (ref 0–0.9)
LYMPHOCYTES # BLD AUTO: 1.68 X10*3/UL (ref 1.2–4.8)
LYMPHOCYTES NFR BLD AUTO: 44.7 %
MCH RBC QN AUTO: 32.6 PG (ref 26–34)
MCHC RBC AUTO-ENTMCNC: 34.7 G/DL (ref 32–36)
MCV RBC AUTO: 94 FL (ref 80–100)
MONOCYTES # BLD AUTO: 0.35 X10*3/UL (ref 0.1–1)
MONOCYTES NFR BLD AUTO: 9.3 %
NEUTROPHILS # BLD AUTO: 1.57 X10*3/UL (ref 1.2–7.7)
NEUTROPHILS NFR BLD AUTO: 41.7 %
NRBC BLD-RTO: 0 /100 WBCS (ref 0–0)
PLATELET # BLD AUTO: 177 X10*3/UL (ref 150–450)
POTASSIUM SERPL-SCNC: 4 MMOL/L (ref 3.5–5.3)
PSA SERPL-MCNC: <0.1 NG/ML
RBC # BLD AUTO: 4.54 X10*6/UL (ref 4.5–5.9)
SODIUM SERPL-SCNC: 140 MMOL/L (ref 136–145)
WBC # BLD AUTO: 3.8 X10*3/UL (ref 4.4–11.3)

## 2025-07-17 PROCEDURE — 80048 BASIC METABOLIC PNL TOTAL CA: CPT

## 2025-07-17 PROCEDURE — 84153 ASSAY OF PSA TOTAL: CPT

## 2025-07-17 PROCEDURE — 85025 COMPLETE CBC W/AUTO DIFF WBC: CPT

## 2025-07-17 PROCEDURE — 82306 VITAMIN D 25 HYDROXY: CPT

## 2025-07-24 ENCOUNTER — APPOINTMENT (OUTPATIENT)
Dept: PRIMARY CARE | Facility: CLINIC | Age: 70
End: 2025-07-24
Payer: MEDICARE

## 2025-08-07 ENCOUNTER — PATIENT MESSAGE (OUTPATIENT)
Dept: PRIMARY CARE | Facility: CLINIC | Age: 70
End: 2025-08-07
Payer: MEDICARE

## 2025-08-07 DIAGNOSIS — M79.675 TOE PAIN, LEFT: ICD-10-CM

## 2025-08-07 DIAGNOSIS — M72.0 DUPUYTREN'S CONTRACTURE OF LEFT HAND: Primary | ICD-10-CM

## 2025-08-07 DIAGNOSIS — M79.674 TOE PAIN, RIGHT: ICD-10-CM

## 2025-08-08 ENCOUNTER — HOSPITAL ENCOUNTER (OUTPATIENT)
Dept: RADIOLOGY | Facility: HOSPITAL | Age: 70
Discharge: HOME | End: 2025-08-08
Payer: MEDICARE

## 2025-08-08 ENCOUNTER — APPOINTMENT (OUTPATIENT)
Dept: LAB | Facility: HOSPITAL | Age: 70
End: 2025-08-08
Payer: MEDICARE

## 2025-08-08 ENCOUNTER — LAB (OUTPATIENT)
Dept: LAB | Facility: HOSPITAL | Age: 70
End: 2025-08-08
Payer: MEDICARE

## 2025-08-08 DIAGNOSIS — M79.675 TOE PAIN, LEFT: ICD-10-CM

## 2025-08-08 DIAGNOSIS — M72.0 DUPUYTREN'S CONTRACTURE OF LEFT HAND: ICD-10-CM

## 2025-08-08 DIAGNOSIS — M25.562 ACUTE PAIN OF LEFT KNEE: ICD-10-CM

## 2025-08-08 DIAGNOSIS — Z00.00 ENCOUNTER FOR GENERAL ADULT MEDICAL EXAMINATION WITHOUT ABNORMAL FINDINGS: ICD-10-CM

## 2025-08-08 LAB
APPEARANCE UR: CLEAR
BACTERIA #/AREA URNS AUTO: ABNORMAL /HPF
BILIRUB UR STRIP.AUTO-MCNC: NEGATIVE MG/DL
COLOR UR: COLORLESS
GLUCOSE UR STRIP.AUTO-MCNC: NORMAL MG/DL
KETONES UR STRIP.AUTO-MCNC: NEGATIVE MG/DL
LEUKOCYTE ESTERASE UR QL STRIP.AUTO: ABNORMAL
MUCOUS THREADS #/AREA URNS AUTO: ABNORMAL /LPF
NITRITE UR QL STRIP.AUTO: NEGATIVE
PH UR STRIP.AUTO: 5.5 [PH]
PROT UR STRIP.AUTO-MCNC: NEGATIVE MG/DL
RBC # UR STRIP.AUTO: NEGATIVE MG/DL
RBC #/AREA URNS AUTO: ABNORMAL /HPF
SP GR UR STRIP.AUTO: 1.01
UROBILINOGEN UR STRIP.AUTO-MCNC: NORMAL MG/DL
WBC #/AREA URNS AUTO: ABNORMAL /HPF

## 2025-08-08 PROCEDURE — 73630 X-RAY EXAM OF FOOT: CPT | Mod: LT

## 2025-08-08 PROCEDURE — 36415 COLL VENOUS BLD VENIPUNCTURE: CPT

## 2025-08-08 PROCEDURE — 81001 URINALYSIS AUTO W/SCOPE: CPT

## 2025-08-08 PROCEDURE — 73130 X-RAY EXAM OF HAND: CPT | Mod: LT

## 2025-08-08 NOTE — TELEPHONE ENCOUNTER
Patient message me back.  He injured his left toe, not right toe.  New x-ray order has been placed for left foot x-ray.    Hang Dugan MD

## 2025-08-08 NOTE — TELEPHONE ENCOUNTER
Patient sent me a text message with an image of his left hand consistent with early Dupuytren's contracture.  He has been referred to orthopedics and has an appointment in 2 weeks with Dr. Kim.    Additionally, patient injured his right fifth toe, striking it against a hard object at home today.  Concern for possible toe fracture.    X-rays of left hand and right foot have been ordered.  MyChart message as well as text message have been sent to patient to complete tomorrow.    Hang Dugan MD

## 2025-08-09 ENCOUNTER — TELEPHONE (OUTPATIENT)
Dept: PRIMARY CARE | Facility: CLINIC | Age: 70
End: 2025-08-09
Payer: MEDICARE

## 2025-08-09 DIAGNOSIS — N30.00 ACUTE CYSTITIS WITHOUT HEMATURIA: Primary | ICD-10-CM

## 2025-08-09 RX ORDER — CIPROFLOXACIN 500 MG/1
500 TABLET, FILM COATED ORAL 2 TIMES DAILY
Qty: 14 TABLET | Refills: 0 | Status: SHIPPED | OUTPATIENT
Start: 2025-08-09 | End: 2025-08-16

## 2025-08-10 NOTE — TELEPHONE ENCOUNTER
Patient and I spoke on 8/9.  UA consistent with possible UTI.  He states that over the last week, he has had urgency and intermittent dysuria.    X-ray of left foot confirms left fifth toe fracture, nondisplaced.  Patient is wearing surgical shoe and limiting activity.    Plan:  1.  Cipro 500 mg twice a day for 7 days  2.  Continue surgical shoe wear.  Messages been sent to his orthopedic specialist, Dr. Calzada for any additional recommendations, if needed.    Office visit with me as scheduled on 8/12.    Hang Dugan MD

## 2025-08-12 ENCOUNTER — APPOINTMENT (OUTPATIENT)
Dept: PRIMARY CARE | Facility: CLINIC | Age: 70
End: 2025-08-12
Payer: MEDICARE

## 2025-08-12 VITALS
SYSTOLIC BLOOD PRESSURE: 120 MMHG | WEIGHT: 190 LBS | DIASTOLIC BLOOD PRESSURE: 60 MMHG | BODY MASS INDEX: 27.26 KG/M2 | OXYGEN SATURATION: 98 % | HEART RATE: 76 BPM

## 2025-08-12 DIAGNOSIS — I10 ESSENTIAL HYPERTENSION: Primary | ICD-10-CM

## 2025-08-12 DIAGNOSIS — E55.9 VITAMIN D DEFICIENCY: ICD-10-CM

## 2025-08-12 DIAGNOSIS — M72.0 DUPUYTREN'S CONTRACTURE OF LEFT HAND: ICD-10-CM

## 2025-08-12 DIAGNOSIS — I35.1 MILD AORTIC REGURGITATION: ICD-10-CM

## 2025-08-12 DIAGNOSIS — J45.30 MILD PERSISTENT ASTHMA WITHOUT COMPLICATION (HHS-HCC): ICD-10-CM

## 2025-08-12 DIAGNOSIS — E78.5 DYSLIPIDEMIA: ICD-10-CM

## 2025-08-12 DIAGNOSIS — D12.6 TUBULAR ADENOMA OF COLON: ICD-10-CM

## 2025-08-12 DIAGNOSIS — Z85.46 HISTORY OF PROSTATE CANCER: ICD-10-CM

## 2025-08-12 DIAGNOSIS — Z00.00 WELLNESS EXAMINATION: Primary | ICD-10-CM

## 2025-08-12 DIAGNOSIS — Z85.828 HISTORY OF SKIN CANCER: ICD-10-CM

## 2025-08-12 DIAGNOSIS — S92.502A CLOSED FRACTURE OF PHALANX OF LEFT FIFTH TOE, INITIAL ENCOUNTER: ICD-10-CM

## 2025-08-12 PROCEDURE — G2211 COMPLEX E/M VISIT ADD ON: HCPCS | Performed by: INTERNAL MEDICINE

## 2025-08-12 PROCEDURE — 1159F MED LIST DOCD IN RCRD: CPT | Performed by: INTERNAL MEDICINE

## 2025-08-12 PROCEDURE — 3074F SYST BP LT 130 MM HG: CPT | Performed by: INTERNAL MEDICINE

## 2025-08-12 PROCEDURE — 3078F DIAST BP <80 MM HG: CPT | Performed by: INTERNAL MEDICINE

## 2025-08-12 PROCEDURE — 1036F TOBACCO NON-USER: CPT | Performed by: INTERNAL MEDICINE

## 2025-08-12 PROCEDURE — 99214 OFFICE O/P EST MOD 30 MIN: CPT | Performed by: INTERNAL MEDICINE

## 2025-08-12 ASSESSMENT — ENCOUNTER SYMPTOMS
CONSTIPATION: 0
BACK PAIN: 0
RHINORRHEA: 0
DIARRHEA: 0
UNEXPECTED WEIGHT CHANGE: 0
HEADACHES: 0
ARTHRALGIAS: 1
SORE THROAT: 0
COUGH: 0
DYSPHORIC MOOD: 0
FREQUENCY: 1
DYSURIA: 1
PALPITATIONS: 0
NERVOUS/ANXIOUS: 0
SHORTNESS OF BREATH: 0
FATIGUE: 0
ABDOMINAL PAIN: 0
DIZZINESS: 0
LIGHT-HEADEDNESS: 0

## 2025-08-26 ENCOUNTER — OFFICE VISIT (OUTPATIENT)
Dept: ORTHOPEDIC SURGERY | Facility: HOSPITAL | Age: 70
End: 2025-08-26
Payer: MEDICARE

## 2025-08-26 VITALS — HEIGHT: 70 IN | WEIGHT: 190 LBS | BODY MASS INDEX: 27.2 KG/M2

## 2025-08-26 DIAGNOSIS — M72.0 DUPUYTREN DISEASE: Primary | ICD-10-CM

## 2025-08-26 PROCEDURE — 1159F MED LIST DOCD IN RCRD: CPT | Performed by: ORTHOPAEDIC SURGERY

## 2025-08-26 PROCEDURE — 99202 OFFICE O/P NEW SF 15 MIN: CPT

## 2025-08-26 PROCEDURE — 1036F TOBACCO NON-USER: CPT | Performed by: ORTHOPAEDIC SURGERY

## 2025-08-26 PROCEDURE — 99203 OFFICE O/P NEW LOW 30 MIN: CPT | Performed by: ORTHOPAEDIC SURGERY

## 2025-08-26 PROCEDURE — 3008F BODY MASS INDEX DOCD: CPT | Performed by: ORTHOPAEDIC SURGERY

## 2025-08-28 LAB
APPEARANCE UR: CLEAR
BACTERIA #/AREA URNS HPF: NORMAL /HPF
BACTERIA UR CULT: NORMAL
BILIRUB UR QL STRIP: NEGATIVE
COLOR UR: YELLOW
GLUCOSE UR QL STRIP: NEGATIVE
HGB UR QL STRIP: NEGATIVE
HYALINE CASTS #/AREA URNS LPF: NORMAL /LPF
KETONES UR QL STRIP: NEGATIVE
LEUKOCYTE ESTERASE UR QL STRIP: NEGATIVE
NITRITE UR QL STRIP: NEGATIVE
PH UR STRIP: 6 [PH] (ref 5–8)
PROT UR QL STRIP: NEGATIVE
RBC #/AREA URNS HPF: NORMAL /HPF
SERVICE CMNT-IMP: NORMAL
SP GR UR STRIP: 1.01 (ref 1–1.03)
SQUAMOUS #/AREA URNS HPF: NORMAL /HPF
WBC #/AREA URNS HPF: NORMAL /HPF

## 2026-01-08 ENCOUNTER — APPOINTMENT (OUTPATIENT)
Dept: DERMATOLOGY | Facility: CLINIC | Age: 71
End: 2026-01-08
Payer: MEDICARE

## 2026-01-30 ENCOUNTER — APPOINTMENT (OUTPATIENT)
Dept: PRIMARY CARE | Facility: CLINIC | Age: 71
End: 2026-01-30
Payer: MEDICARE

## (undated) DEVICE — Device

## (undated) DEVICE — STRIP, SKIN CLOSURE, STERI STRIP, REINFORCED, 0.5 X 4 IN

## (undated) DEVICE — SUTURE, VICRYL, 4-0, 18 IN, PS2, UNDYED

## (undated) DEVICE — PAD, GROUNDING, ELECTROSURGICAL, W/9 FT CABLE, POLYHESIVE II, ADULT, LF

## (undated) DEVICE — DRAPE, SHEET, ENDOSCOPY, GENERAL, FENESTRATED, ARMBOARD COVER, 98 X 123.5 IN, DISPOSABLE, LF, STERILE

## (undated) DEVICE — SUTURE, PROLENE, 2-0, 30 IN, SH, BLUE

## (undated) DEVICE — TOWEL, SURGICAL, NEURO, O/R, 16 X 26, BLUE, STERILE

## (undated) DEVICE — SUTURE, PROLENE, 0, 30 IN, SH

## (undated) DEVICE — APPLICATOR, CHLORAPREP, W/ORANGE TINT, 26ML

## (undated) DEVICE — GLOVE, SURGICAL, PROTEXIS PI W/NEU-THERA, 7.5, PF, LF